# Patient Record
Sex: MALE | Race: WHITE | NOT HISPANIC OR LATINO | URBAN - METROPOLITAN AREA
[De-identification: names, ages, dates, MRNs, and addresses within clinical notes are randomized per-mention and may not be internally consistent; named-entity substitution may affect disease eponyms.]

---

## 2020-12-11 ENCOUNTER — INPATIENT (INPATIENT)
Facility: HOSPITAL | Age: 55
LOS: 2 days | Discharge: HOME | DRG: 247 | End: 2020-12-14
Attending: HOSPITALIST | Admitting: INTERNAL MEDICINE
Payer: COMMERCIAL

## 2020-12-11 VITALS
OXYGEN SATURATION: 93 % | HEART RATE: 92 BPM | RESPIRATION RATE: 19 BRPM | SYSTOLIC BLOOD PRESSURE: 150 MMHG | HEIGHT: 72 IN | DIASTOLIC BLOOD PRESSURE: 84 MMHG | WEIGHT: 253.75 LBS | TEMPERATURE: 98 F

## 2020-12-11 DIAGNOSIS — I21.3 ST ELEVATION (STEMI) MYOCARDIAL INFARCTION OF UNSPECIFIED SITE: ICD-10-CM

## 2020-12-11 LAB
A1C WITH ESTIMATED AVERAGE GLUCOSE RESULT: 7.2 % — HIGH (ref 4–5.6)
ANION GAP SERPL CALC-SCNC: 13 MMOL/L — SIGNIFICANT CHANGE UP (ref 5–17)
ANION GAP SERPL CALC-SCNC: 25 MMOL/L — HIGH (ref 5–17)
APTT BLD: >200 SEC — CRITICAL HIGH (ref 27.5–35.5)
BASOPHILS # BLD AUTO: 0.02 K/UL — SIGNIFICANT CHANGE UP (ref 0–0.2)
BASOPHILS NFR BLD AUTO: 0.2 % — SIGNIFICANT CHANGE UP (ref 0–2)
BLD GP AB SCN SERPL QL: NEGATIVE — SIGNIFICANT CHANGE UP
BUN SERPL-MCNC: 14 MG/DL — SIGNIFICANT CHANGE UP (ref 7–23)
BUN SERPL-MCNC: 18 MG/DL — SIGNIFICANT CHANGE UP (ref 7–23)
CALCIUM SERPL-MCNC: 8.8 MG/DL — SIGNIFICANT CHANGE UP (ref 8.4–10.5)
CALCIUM SERPL-MCNC: 9.2 MG/DL — SIGNIFICANT CHANGE UP (ref 8.4–10.5)
CHLORIDE SERPL-SCNC: 103 MMOL/L — SIGNIFICANT CHANGE UP (ref 96–108)
CHLORIDE SERPL-SCNC: 97 MMOL/L — SIGNIFICANT CHANGE UP (ref 96–108)
CHOLEST SERPL-MCNC: 169 MG/DL — SIGNIFICANT CHANGE UP
CHOLEST SERPL-MCNC: 169 MG/DL — SIGNIFICANT CHANGE UP
CK MB BLD-MCNC: 6.5 % — HIGH (ref 0–3.5)
CK MB BLD-MCNC: 8.6 % — HIGH (ref 0–3.5)
CK MB BLD-MCNC: 8.6 % — HIGH (ref 0–3.5)
CK MB CFR SERPL CALC: 14.1 NG/ML — HIGH (ref 0–6.7)
CK MB CFR SERPL CALC: 30.6 NG/ML — HIGH (ref 0–6.7)
CK MB CFR SERPL CALC: 44.6 NG/ML — HIGH (ref 0–6.7)
CK SERPL-CCNC: 217 U/L — HIGH (ref 30–200)
CK SERPL-CCNC: 354 U/L — HIGH (ref 30–200)
CK SERPL-CCNC: 518 U/L — HIGH (ref 30–200)
CO2 SERPL-SCNC: 16 MMOL/L — LOW (ref 22–31)
CO2 SERPL-SCNC: 23 MMOL/L — SIGNIFICANT CHANGE UP (ref 22–31)
CREAT SERPL-MCNC: 0.67 MG/DL — SIGNIFICANT CHANGE UP (ref 0.5–1.3)
CREAT SERPL-MCNC: 0.71 MG/DL — SIGNIFICANT CHANGE UP (ref 0.5–1.3)
EOSINOPHIL # BLD AUTO: 0.03 K/UL — SIGNIFICANT CHANGE UP (ref 0–0.5)
EOSINOPHIL NFR BLD AUTO: 0.4 % — SIGNIFICANT CHANGE UP (ref 0–6)
ESTIMATED AVERAGE GLUCOSE: 160 MG/DL — HIGH (ref 68–114)
GLUCOSE BLDC GLUCOMTR-MCNC: 112 MG/DL — HIGH (ref 70–99)
GLUCOSE SERPL-MCNC: 141 MG/DL — HIGH (ref 70–99)
GLUCOSE SERPL-MCNC: 225 MG/DL — HIGH (ref 70–99)
HCT VFR BLD CALC: 44.7 % — SIGNIFICANT CHANGE UP (ref 39–50)
HDLC SERPL-MCNC: 28 MG/DL — LOW
HDLC SERPL-MCNC: 30 MG/DL — LOW
HGB BLD-MCNC: 14.2 G/DL — SIGNIFICANT CHANGE UP (ref 13–17)
IMM GRANULOCYTES NFR BLD AUTO: 0.4 % — SIGNIFICANT CHANGE UP (ref 0–1.5)
INR BLD: 1.17 RATIO — HIGH (ref 0.88–1.16)
LIPID PNL WITH DIRECT LDL SERPL: 114 MG/DL — HIGH
LIPID PNL WITH DIRECT LDL SERPL: 121 MG/DL — HIGH
LYMPHOCYTES # BLD AUTO: 1.13 K/UL — SIGNIFICANT CHANGE UP (ref 1–3.3)
LYMPHOCYTES # BLD AUTO: 14 % — SIGNIFICANT CHANGE UP (ref 13–44)
MAGNESIUM SERPL-MCNC: 1.8 MG/DL — SIGNIFICANT CHANGE UP (ref 1.6–2.6)
MAGNESIUM SERPL-MCNC: 2.2 MG/DL — SIGNIFICANT CHANGE UP (ref 1.6–2.6)
MCHC RBC-ENTMCNC: 28 PG — SIGNIFICANT CHANGE UP (ref 27–34)
MCHC RBC-ENTMCNC: 31.8 GM/DL — LOW (ref 32–36)
MCV RBC AUTO: 88 FL — SIGNIFICANT CHANGE UP (ref 80–100)
MONOCYTES # BLD AUTO: 0.41 K/UL — SIGNIFICANT CHANGE UP (ref 0–0.9)
MONOCYTES NFR BLD AUTO: 5.1 % — SIGNIFICANT CHANGE UP (ref 2–14)
NEUTROPHILS # BLD AUTO: 6.48 K/UL — SIGNIFICANT CHANGE UP (ref 1.8–7.4)
NEUTROPHILS NFR BLD AUTO: 79.9 % — HIGH (ref 43–77)
NON HDL CHOLESTEROL: 139 MG/DL — HIGH
NON HDL CHOLESTEROL: 140 MG/DL — HIGH
NRBC # BLD: 0 /100 WBCS — SIGNIFICANT CHANGE UP (ref 0–0)
NT-PROBNP SERPL-SCNC: 53 PG/ML — SIGNIFICANT CHANGE UP (ref 0–300)
PHOSPHATE SERPL-MCNC: 2.9 MG/DL — SIGNIFICANT CHANGE UP (ref 2.5–4.5)
PHOSPHATE SERPL-MCNC: 3 MG/DL — SIGNIFICANT CHANGE UP (ref 2.5–4.5)
PLATELET # BLD AUTO: 154 K/UL — SIGNIFICANT CHANGE UP (ref 150–400)
POTASSIUM SERPL-MCNC: 3.6 MMOL/L — SIGNIFICANT CHANGE UP (ref 3.5–5.3)
POTASSIUM SERPL-MCNC: 4.1 MMOL/L — SIGNIFICANT CHANGE UP (ref 3.5–5.3)
POTASSIUM SERPL-SCNC: 3.6 MMOL/L — SIGNIFICANT CHANGE UP (ref 3.5–5.3)
POTASSIUM SERPL-SCNC: 4.1 MMOL/L — SIGNIFICANT CHANGE UP (ref 3.5–5.3)
PROTHROM AB SERPL-ACNC: 13.9 SEC — HIGH (ref 10.6–13.6)
RBC # BLD: 5.08 M/UL — SIGNIFICANT CHANGE UP (ref 4.2–5.8)
RBC # FLD: 14.6 % — HIGH (ref 10.3–14.5)
RH IG SCN BLD-IMP: POSITIVE — SIGNIFICANT CHANGE UP
SODIUM SERPL-SCNC: 137 MMOL/L — SIGNIFICANT CHANGE UP (ref 135–145)
SODIUM SERPL-SCNC: 139 MMOL/L — SIGNIFICANT CHANGE UP (ref 135–145)
TRIGL SERPL-MCNC: 130 MG/DL — SIGNIFICANT CHANGE UP
TRIGL SERPL-MCNC: 90 MG/DL — SIGNIFICANT CHANGE UP
TROPONIN T, HIGH SENSITIVITY RESULT: 1464 NG/L — HIGH (ref 0–51)
TROPONIN T, HIGH SENSITIVITY RESULT: 156 NG/L — HIGH (ref 0–51)
TROPONIN T, HIGH SENSITIVITY RESULT: 883 NG/L — HIGH (ref 0–51)
TSH SERPL-MCNC: 2.2 UIU/ML — SIGNIFICANT CHANGE UP (ref 0.27–4.2)
WBC # BLD: 8.1 K/UL — SIGNIFICANT CHANGE UP (ref 3.8–10.5)
WBC # FLD AUTO: 8.1 K/UL — SIGNIFICANT CHANGE UP (ref 3.8–10.5)

## 2020-12-11 PROCEDURE — 99223 1ST HOSP IP/OBS HIGH 75: CPT | Mod: 25

## 2020-12-11 PROCEDURE — 99291 CRITICAL CARE FIRST HOUR: CPT | Mod: 25

## 2020-12-11 PROCEDURE — 93010 ELECTROCARDIOGRAM REPORT: CPT | Mod: 59

## 2020-12-11 PROCEDURE — 99291 CRITICAL CARE FIRST HOUR: CPT

## 2020-12-11 PROCEDURE — 92941 PRQ TRLML REVSC TOT OCCL AMI: CPT | Mod: LD

## 2020-12-11 PROCEDURE — 93458 L HRT ARTERY/VENTRICLE ANGIO: CPT | Mod: 26,59

## 2020-12-11 PROCEDURE — 93308 TTE F-UP OR LMTD: CPT | Mod: 26

## 2020-12-11 RX ORDER — DEXTROSE 50 % IN WATER 50 %
15 SYRINGE (ML) INTRAVENOUS ONCE
Refills: 0 | Status: DISCONTINUED | OUTPATIENT
Start: 2020-12-11 | End: 2020-12-14

## 2020-12-11 RX ORDER — INFLUENZA VIRUS VACCINE 15; 15; 15; 15 UG/.5ML; UG/.5ML; UG/.5ML; UG/.5ML
0.5 SUSPENSION INTRAMUSCULAR ONCE
Refills: 0 | Status: DISCONTINUED | OUTPATIENT
Start: 2020-12-11 | End: 2020-12-14

## 2020-12-11 RX ORDER — HEPARIN SODIUM 5000 [USP'U]/ML
5000 INJECTION INTRAVENOUS; SUBCUTANEOUS EVERY 8 HOURS
Refills: 0 | Status: DISCONTINUED | OUTPATIENT
Start: 2020-12-11 | End: 2020-12-14

## 2020-12-11 RX ORDER — ATORVASTATIN CALCIUM 80 MG/1
80 TABLET, FILM COATED ORAL AT BEDTIME
Refills: 0 | Status: DISCONTINUED | OUTPATIENT
Start: 2020-12-11 | End: 2020-12-14

## 2020-12-11 RX ORDER — TICAGRELOR 90 MG/1
90 TABLET ORAL EVERY 12 HOURS
Refills: 0 | Status: DISCONTINUED | OUTPATIENT
Start: 2020-12-11 | End: 2020-12-14

## 2020-12-11 RX ORDER — CHLORHEXIDINE GLUCONATE 213 G/1000ML
1 SOLUTION TOPICAL DAILY
Refills: 0 | Status: DISCONTINUED | OUTPATIENT
Start: 2020-12-11 | End: 2020-12-12

## 2020-12-11 RX ORDER — ASPIRIN/CALCIUM CARB/MAGNESIUM 324 MG
81 TABLET ORAL DAILY
Refills: 0 | Status: DISCONTINUED | OUTPATIENT
Start: 2020-12-11 | End: 2020-12-14

## 2020-12-11 RX ORDER — INSULIN LISPRO 100/ML
VIAL (ML) SUBCUTANEOUS
Refills: 0 | Status: DISCONTINUED | OUTPATIENT
Start: 2020-12-11 | End: 2020-12-11

## 2020-12-11 RX ORDER — SODIUM CHLORIDE 9 MG/ML
1000 INJECTION, SOLUTION INTRAVENOUS
Refills: 0 | Status: DISCONTINUED | OUTPATIENT
Start: 2020-12-11 | End: 2020-12-12

## 2020-12-11 RX ORDER — DEXTROSE 50 % IN WATER 50 %
25 SYRINGE (ML) INTRAVENOUS ONCE
Refills: 0 | Status: DISCONTINUED | OUTPATIENT
Start: 2020-12-11 | End: 2020-12-14

## 2020-12-11 RX ORDER — GLUCAGON INJECTION, SOLUTION 0.5 MG/.1ML
1 INJECTION, SOLUTION SUBCUTANEOUS ONCE
Refills: 0 | Status: DISCONTINUED | OUTPATIENT
Start: 2020-12-11 | End: 2020-12-14

## 2020-12-11 RX ORDER — DEXTROSE 50 % IN WATER 50 %
12.5 SYRINGE (ML) INTRAVENOUS ONCE
Refills: 0 | Status: DISCONTINUED | OUTPATIENT
Start: 2020-12-11 | End: 2020-12-14

## 2020-12-11 RX ORDER — INSULIN LISPRO 100/ML
VIAL (ML) SUBCUTANEOUS
Refills: 0 | Status: DISCONTINUED | OUTPATIENT
Start: 2020-12-11 | End: 2020-12-14

## 2020-12-11 RX ORDER — POTASSIUM CHLORIDE 20 MEQ
40 PACKET (EA) ORAL ONCE
Refills: 0 | Status: COMPLETED | OUTPATIENT
Start: 2020-12-11 | End: 2020-12-11

## 2020-12-11 RX ORDER — INSULIN LISPRO 100/ML
VIAL (ML) SUBCUTANEOUS AT BEDTIME
Refills: 0 | Status: DISCONTINUED | OUTPATIENT
Start: 2020-12-11 | End: 2020-12-14

## 2020-12-11 RX ORDER — ENOXAPARIN SODIUM 100 MG/ML
40 INJECTION SUBCUTANEOUS DAILY
Refills: 0 | Status: DISCONTINUED | OUTPATIENT
Start: 2020-12-11 | End: 2020-12-11

## 2020-12-11 RX ORDER — MAGNESIUM SULFATE 500 MG/ML
1 VIAL (ML) INJECTION ONCE
Refills: 0 | Status: COMPLETED | OUTPATIENT
Start: 2020-12-11 | End: 2020-12-11

## 2020-12-11 RX ORDER — METOPROLOL TARTRATE 50 MG
25 TABLET ORAL
Refills: 0 | Status: DISCONTINUED | OUTPATIENT
Start: 2020-12-11 | End: 2020-12-12

## 2020-12-11 RX ADMIN — Medication 100 GRAM(S): at 08:59

## 2020-12-11 RX ADMIN — ATORVASTATIN CALCIUM 80 MILLIGRAM(S): 80 TABLET, FILM COATED ORAL at 22:10

## 2020-12-11 RX ADMIN — Medication 2: at 12:56

## 2020-12-11 RX ADMIN — Medication 81 MILLIGRAM(S): at 12:55

## 2020-12-11 RX ADMIN — Medication 25 MILLIGRAM(S): at 18:48

## 2020-12-11 RX ADMIN — HEPARIN SODIUM 5000 UNIT(S): 5000 INJECTION INTRAVENOUS; SUBCUTANEOUS at 17:23

## 2020-12-11 RX ADMIN — HEPARIN SODIUM 5000 UNIT(S): 5000 INJECTION INTRAVENOUS; SUBCUTANEOUS at 22:09

## 2020-12-11 RX ADMIN — Medication 2: at 09:00

## 2020-12-11 RX ADMIN — Medication 40 MILLIEQUIVALENT(S): at 08:59

## 2020-12-11 RX ADMIN — TICAGRELOR 90 MILLIGRAM(S): 90 TABLET ORAL at 17:23

## 2020-12-11 NOTE — PROGRESS NOTE ADULT - SUBJECTIVE AND OBJECTIVE BOX
Madisyn Dotson, PGY-2  Internal Medicine   [id number]    PATIENT:  NISHA WISE  [id number]    CHIEF COMPLAINT:  Patient is a 55y old  Male who presents with a chief complaint of     INTERVAL HISTORY/OVERNIGHT EVENTS:    SUBJECTIVE:     MEDICATIONS:  MEDICATIONS  (STANDING):  aspirin enteric coated 81 milliGRAM(s) Oral daily  atorvastatin 80 milliGRAM(s) Oral at bedtime  chlorhexidine 4% Liquid 1 Application(s) Topical daily  influenza   Vaccine 0.5 milliLiter(s) IntraMuscular once  insulin lispro (ADMELOG) corrective regimen sliding scale   SubCutaneous three times a day before meals  insulin lispro (ADMELOG) corrective regimen sliding scale   SubCutaneous at bedtime  ticagrelor 90 milliGRAM(s) Oral every 12 hours    MEDICATIONS  (PRN):      ALLERGIES:  Allergies    Allergy Status Unknown    Intolerances        OBJECTIVE:  ICU Vital Signs Last 24 Hrs  T(C): 36.5 (11 Dec 2020 05:35), Max: 36.5 (11 Dec 2020 05:35)  T(F): 97.7 (11 Dec 2020 05:35), Max: 97.7 (11 Dec 2020 05:35)  HR: 82 (11 Dec 2020 06:30) (82 - 92)  BP: 102/66 (11 Dec 2020 06:30) (102/66 - 150/84)  BP(mean): 78 (11 Dec 2020 06:30) (78 - 111)  ABP: --  ABP(mean): --  RR: 14 (11 Dec 2020 06:30) (14 - 19)  SpO2: 91% (11 Dec 2020 06:30) (91% - 94%)      Adult Advanced Hemodynamics Last 24 Hrs  CVP(mm Hg): --  CVP(cm H2O): --  CO: --  CI: --  PA: --  PA(mean): --  PCWP: --  SVR: --  SVRI: --  PVR: --  PVRI: --  CAPILLARY BLOOD GLUCOSE        CAPILLARY BLOOD GLUCOSE        I&O's Summary    10 Dec 2020 07:01  -  11 Dec 2020 07:00  --------------------------------------------------------  IN: 0 mL / OUT: 450 mL / NET: -450 mL      Daily Height in cm: 182.88 (11 Dec 2020 05:35)    Daily     PHYSICAL EXAMINATION:  General: WN/WD NAD  HEENT: PERRLA, EOMI, moist mucous membranes  Neurology: A&Ox3, nonfocal, MERAZ x 4  Respiratory: CTA B/L, normal respiratory effort, no wheezes, crackles, rales  CV: RRR, S1S2, no murmurs, rubs or gallops  Abdominal: Soft, NT, ND +BS, Last BM  Extremities: No edema, + peripheral pulses  Incisions:   Tubes:    LABS:                          14.2   8.10  )-----------( 154      ( 11 Dec 2020 06:13 )             44.7             PT/INR - ( 11 Dec 2020 06:13 )   PT: 13.9 sec;   INR: 1.17 ratio         PTT - ( 11 Dec 2020 06:13 )  PTT:>200.0 sec            TELEMETRY:     EKG:     IMAGING:       Madisyn Dotson, PGY-2  Internal Medicine   [id number]    PATIENT:  NISHA WISE  [id number]    CHIEF COMPLAINT:  Patient is a 55y old  Male who presents with a chief complaint of chest pain    INTERVAL HISTORY/OVERNIGHT EVENTS: Patient s/p LHC via R radial artery access, patient tolerated the procedure well     SUBJECTIVE: Assessed patient this AM, patient alert, denies chest pain/shortness of breath/abdominal pain.     MEDICATIONS:  MEDICATIONS  (STANDING):  aspirin enteric coated 81 milliGRAM(s) Oral daily  atorvastatin 80 milliGRAM(s) Oral at bedtime  chlorhexidine 4% Liquid 1 Application(s) Topical daily  influenza   Vaccine 0.5 milliLiter(s) IntraMuscular once  insulin lispro (ADMELOG) corrective regimen sliding scale   SubCutaneous three times a day before meals  insulin lispro (ADMELOG) corrective regimen sliding scale   SubCutaneous at bedtime  ticagrelor 90 milliGRAM(s) Oral every 12 hours    MEDICATIONS  (PRN):      ALLERGIES:  Allergies    Allergy Status Unknown    Intolerances        OBJECTIVE:  ICU Vital Signs Last 24 Hrs  T(C): 36.5 (11 Dec 2020 05:35), Max: 36.5 (11 Dec 2020 05:35)  T(F): 97.7 (11 Dec 2020 05:35), Max: 97.7 (11 Dec 2020 05:35)  HR: 82 (11 Dec 2020 06:30) (82 - 92)  BP: 102/66 (11 Dec 2020 06:30) (102/66 - 150/84)  BP(mean): 78 (11 Dec 2020 06:30) (78 - 111)  ABP: --  ABP(mean): --  RR: 14 (11 Dec 2020 06:30) (14 - 19)  SpO2: 91% (11 Dec 2020 06:30) (91% - 94%)      Adult Advanced Hemodynamics Last 24 Hrs  CVP(mm Hg): --  CVP(cm H2O): --  CO: --  CI: --  PA: --  PA(mean): --  PCWP: --  SVR: --  SVRI: --  PVR: --  PVRI: --  CAPILLARY BLOOD GLUCOSE        CAPILLARY BLOOD GLUCOSE        I&O's Summary    10 Dec 2020 07:01  -  11 Dec 2020 07:00  --------------------------------------------------------  IN: 0 mL / OUT: 450 mL / NET: -450 mL      Daily Height in cm: 182.88 (11 Dec 2020 05:35)    Daily     PHYSICAL EXAMINATION:  General: WN/WD NAD  HEENT: PERRLA, EOMI, moist mucous membranes  Neurology: A&Ox3, moves all 4 extremities spontaneously, symmetric facial expressions   Respiratory: normal respiratory effort, + crackles on lower lung fields   CV: RRR, S1S2, no murmurs, rubs or gallops  Abdominal: Soft, NT, ND +BS  Extremities: No LE edema. RUE intact sensation light touch, full range of motion.   Incisions: R radial artery access site clean, dry, intact, no swelling, no hematoma.   Tubes:    LABS:                          14.2   8.10  )-----------( 154      ( 11 Dec 2020 06:13 )             44.7             PT/INR - ( 11 Dec 2020 06:13 )   PT: 13.9 sec;   INR: 1.17 ratio         PTT - ( 11 Dec 2020 06:13 )  PTT:>200.0 sec            TELEMETRY:     EKG:     IMAGING:

## 2020-12-11 NOTE — H&P ADULT - NSHPPHYSICALEXAM_GEN_ALL_CORE
Const: AAOx3, in NAD  Eyes: no conjunctival injection  HENT: NCAT, Neck supple, oral mucosa moist  CV: RRR, +S1, S2  Resp: CTAB, no respiratory distress  GI: Abdomen soft, NTND, no guarding  Extremities: No peripheral edema,  2+ L radial and BL DP pulses, R radial access band in place  Skin: Warm, well perfused, no rash  MSK: No gross deformities appreciated  Neuro: No focal sensory or motor deficits  Psych: Appropriate mood and affect

## 2020-12-11 NOTE — CHART NOTE - NSCHARTNOTEFT_GEN_A_CORE
NISHA WISE  MRN#: [id number]    : Kevin Shabazz MD    INDICATION: STEMI    PROCEDURE:  LIMITED TRANSTHORACIC ECHO    FINDINGS:  Left ventricular size is normal   Left ventricular function is severely reduced, EF is 30-35%  The anterior wall and apex appear severely hypokinetic     Right ventricular size is normal   Right ventricular function is normal    Aortic valve is not thickened or calcified  Aortic valve has no regurgitation    Mitral valve is not thickened or calcified and without annular calcification  Mitral valve has no regurgitation    Tricuspid valve is not thickened or calcified     Left and right atrial sizes appear normal     IVC size is moderately dilated  There is <50% respirophasic change on inspiration    No significant pericardial effusion    INTERPRETATION:  Severely reduced LVEF with wall motion consistent with LAD disease  Normal RV function  CVP appx 12 cm H2O

## 2020-12-11 NOTE — PROGRESS NOTE ADULT - SUBJECTIVE AND OBJECTIVE BOX
NISHA WISE  MRN-[id number]  Patient is a 55y old  Male who presents with a chief complaint of STEMI (11 Dec 2020 07:41)    HPI:  55M PMH DM, AFib s/p ablation (not on AC) presented to [facility] with 10/10 chest pain waking him from sleep at 3am. Found to have SABIHA V2-V6, and transferred to [facility] for cath. S/p PCI DESx2 to 100% pLAD.     Patient states he is currently pain free. (11 Dec 2020 06:16)      Hospital Course:    24 HOUR EVENTS:    REVIEW OF SYSTEMS:   Constitutional: No weakness, fevers, or chills  Eyes/ENT: No visual changes  Respiratory: No cough, wheezing, hemoptysis  Cardiovascular: No chest pain, no palpitations  Gastrointestinal: No abdominal pain. No nausea, vomiting, hematemesis.   Genitourinary: No dysuria  Neurological: No numbness, no weakness  Skin: No itching, rashes    ICU Vital Signs Last 24 Hrs  T(C): 37.6 (11 Dec 2020 19:00), Max: 37.6 (11 Dec 2020 19:00)  T(F): 99.7 (11 Dec 2020 19:00), Max: 99.7 (11 Dec 2020 19:00)  HR: 82 (11 Dec 2020 23:00) (72 - 92)  BP: 133/76 (11 Dec 2020 23:00) (99/63 - 151/84)  BP(mean): 93 (11 Dec 2020 23:00) (74 - 111)  ABP: --  ABP(mean): --  RR: 15 (11 Dec 2020 23:00) (8 - 27)  SpO2: 94% (11 Dec 2020 23:00) (91% - 97%)      CVP(mm Hg): --  CO: --  CI: --  PA: --  PA(mean): --  PA(direct): --  PCWP: --  LA: --  RA: --  SVR: --  SVRI: --  PVR: --  PVRI: --  I&O's Summary    10 Dec 2020 07:01  -  11 Dec 2020 07:00  --------------------------------------------------------  IN: 0 mL / OUT: 1350 mL / NET: -1350 mL    11 Dec 2020 07:01  -  11 Dec 2020 23:45  --------------------------------------------------------  IN: 700 mL / OUT: 1225 mL / NET: -525 mL        CAPILLARY BLOOD GLUCOSE    CAPILLARY BLOOD GLUCOSE      POCT Blood Glucose.: 112 mg/dL (11 Dec 2020 20:57)      PHYSICAL EXAM:   General: No acute distress  Eyes: EOMI, PERRLA, conjunctiva and sclera clear  Chest/Lung: CTAB, no wheezes, rales, or rhonchi  Heart: Regular rate, regular rhythm. Normal S1/S2. No murmurs, rubs, or gallops.  Abdomen: Soft, nontender, nondistended. Normal bowel sounds.  Extremites: 2+ peripheral pulses B/L. No clubbing, cyanosis, or edema.  Neurology: A&O x3, no focal deficits  Skin: No rashes or lesions  ============================I/O===========================   I&O's Detail    10 Dec 2020 07:01  -  11 Dec 2020 07:00  --------------------------------------------------------  IN:  Total IN: 0 mL    OUT:    Voided (mL): 1350 mL  Total OUT: 1350 mL    Total NET: -1350 mL      11 Dec 2020 07:01  -  11 Dec 2020 23:45  --------------------------------------------------------  IN:    IV PiggyBack: 100 mL    Oral Fluid: 600 mL  Total IN: 700 mL    OUT:    Voided (mL): 1225 mL  Total OUT: 1225 mL    Total NET: -525 mL        ============================ LABS =========================                        14.2   8.10  )-----------( 154      ( 11 Dec 2020 06:13 )             44.7     12-11    139  |  103  |  14  ----------------------------<  141<H>  4.1   |  23  |  0.67    Ca    9.2      11 Dec 2020 13:25  Phos  2.9     12-11  Mg     2.2     12-11      Troponin T, High Sensitivity Result: 1464 ng/L (12-11-20 @ 21:07)  Troponin T, High Sensitivity Result: 883 ng/L (12-11-20 @ 13:25)  Troponin T, High Sensitivity Result: 156 ng/L (12-11-20 @ 06:13)    CKMB Units: 44.6 ng/mL (12-11-20 @ 21:07)  CKMB Units: 30.6 ng/mL (12-11-20 @ 13:25)  CKMB Units: 14.1 ng/mL (12-11-20 @ 06:13)    Creatine Kinase, Serum: 518 U/L (12-11-20 @ 21:07)  Creatine Kinase, Serum: 354 U/L (12-11-20 @ 13:25)  Creatine Kinase, Serum: 217 U/L (12-11-20 @ 06:13)    CPK Mass Assay %: 8.6 % (12-11-20 @ 21:07)  CPK Mass Assay %: 8.6 % (12-11-20 @ 13:25)  CPK Mass Assay %: 6.5 % (12-11-20 @ 06:13)          PT/INR - ( 11 Dec 2020 06:13 )   PT: 13.9 sec;   INR: 1.17 ratio         PTT - ( 11 Dec 2020 06:13 )  PTT:>200.0 sec        ======================Micro/Rad/Cardio=================  Telemetry: Reviewed   EKG: Reviewed  CXR: Reviewed  Culture: Reviewed   Echo: Reviewed  Cath: Reviewed  ======================================================  PAST MEDICAL & SURGICAL HISTORY:    ====================ASSESSMENT ==============    Plan:  ====================== NEUROLOGY=====================    ==================== RESPIRATORY======================  Mechanical Ventilation:      ====================CARDIOVASCULAR==================  metoprolol succinate ER 25 milliGRAM(s) Oral two times a day  atorvastatin 80 milliGRAM(s) Oral at bedtime    ===================HEMATOLOGIC/ONC ===================  aspirin enteric coated 81 milliGRAM(s) Oral daily  heparin   Injectable 5000 Unit(s) SubCutaneous every 8 hours  ticagrelor 90 milliGRAM(s) Oral every 12 hours    ===================== RENAL =========================  Continue monitoring urine output    ==================== GASTROINTESTINAL===================  dextrose 5%. 1000 milliLiter(s) (50 mL/Hr) IV Continuous <Continuous>  dextrose 5%. 1000 milliLiter(s) (100 mL/Hr) IV Continuous <Continuous>    =======================    ENDOCRINE  =====================    dextrose 40% Gel 15 Gram(s) Oral once  dextrose 50% Injectable 25 Gram(s) IV Push once  dextrose 50% Injectable 12.5 Gram(s) IV Push once  dextrose 50% Injectable 25 Gram(s) IV Push once  glucagon  Injectable 1 milliGRAM(s) IntraMuscular once  insulin lispro (ADMELOG) corrective regimen sliding scale   SubCutaneous at bedtime  insulin lispro (ADMELOG) corrective regimen sliding scale   SubCutaneous three times a day before meals    ========================INFECTIOUS DISEASE================      Patient requires continuous monitoring with bedside rhythm monitoring, pulse ox monitoring, and intermittent blood gas analysis. Care plan discussed with ICU care team. Patient remained critical and at risk for life threatening decompensation.  Patient seen, examined and plan discussed with CCU team during rounds.     I have personally provided 35 minutes of critical care time excluding time spent on separate procedures.    By signing my name below, I, Cherelle Salgado, attest that this documentation has been prepared under the direction and in the presence of  DURGA Estrada   Electronically signed: Td Chairez, 12-11-20 @ 23:45    I, DURGA Estrada , personally performed the services described in this documentation. all medical record entries made by the susiibbryon were at my direction and in my presence. I have reviewed the chart and agree that the record reflects my personal performance and is accurate and complete  Electronically signed: DURGA Estrada        NISHA WISE  MRN-[id number]  Patient is a 55y old  Male who presents with a chief complaint of STEMI (11 Dec 2020 07:41)    HPI:  55M PMH DM, AFib s/p ablation (not on AC) presented to [facility] with 10/10 chest pain waking him from sleep at 3am. Found to have SABIHA V2-V6, and transferred to [facility] for cath. S/p PCI DESx2 to 100% pLAD.     Patient states he is currently pain free. (11 Dec 2020 06:16)      Hospital Course:  12/11 Admitted to CCU for AWSTEMI s/p MARTHA x2     24 HOUR EVENTS:  Admitted to CCU for AWSTEMI s/p MARTHA x2     REVIEW OF SYSTEMS:   Constitutional: No weakness, fevers, or chills  Eyes/ENT: No visual changes  Respiratory: No cough, wheezing, hemoptysis  Cardiovascular: No chest pain, no palpitations  Gastrointestinal: No abdominal pain. No nausea, vomiting, hematemesis.   Genitourinary: No dysuria  Neurological: No numbness, no weakness  Skin: No itching, rashes    ICU Vital Signs Last 24 Hrs  T(C): 37.6 (11 Dec 2020 19:00), Max: 37.6 (11 Dec 2020 19:00)  T(F): 99.7 (11 Dec 2020 19:00), Max: 99.7 (11 Dec 2020 19:00)  HR: 82 (11 Dec 2020 23:00) (72 - 92)  BP: 133/76 (11 Dec 2020 23:00) (99/63 - 151/84)  BP(mean): 93 (11 Dec 2020 23:00) (74 - 111)  ABP: --  ABP(mean): --  RR: 15 (11 Dec 2020 23:00) (8 - 27)  SpO2: 94% (11 Dec 2020 23:00) (91% - 97%)      I&O's Summary    10 Dec 2020 07:01  -  11 Dec 2020 07:00  --------------------------------------------------------  IN: 0 mL / OUT: 1350 mL / NET: -1350 mL    11 Dec 2020 07:01  -  11 Dec 2020 23:45  --------------------------------------------------------  IN: 700 mL / OUT: 1225 mL / NET: -525 mL        POCT Blood Glucose.: 112 mg/dL (11 Dec 2020 20:57)      PHYSICAL EXAM:   General: No acute distress  Eyes: EOMI, PERRLA, conjunctiva and sclera clear  Chest/Lung: CTAB, no wheezes, rales, or rhonchi  Heart: Regular rate, regular rhythm. Normal S1/S2. No murmurs, rubs, or gallops.  Abdomen: Soft, nontender, nondistended. Normal bowel sounds.  Extremites: 2+ peripheral pulses B/L. No clubbing, cyanosis, or edema.  Neurology: A&O x3, no focal deficits  Skin: No rashes or lesions  ============================I/O===========================   I&O's Detail    10 Dec 2020 07:01  -  11 Dec 2020 07:00  --------------------------------------------------------  IN:  Total IN: 0 mL    OUT:    Voided (mL): 1350 mL  Total OUT: 1350 mL    Total NET: -1350 mL      11 Dec 2020 07:01  -  11 Dec 2020 23:45  --------------------------------------------------------  IN:    IV PiggyBack: 100 mL    Oral Fluid: 600 mL  Total IN: 700 mL    OUT:    Voided (mL): 1225 mL  Total OUT: 1225 mL    Total NET: -525 mL        ============================ LABS =========================                        14.2   8.10  )-----------( 154      ( 11 Dec 2020 06:13 )             44.7     12-11    139  |  103  |  14  ----------------------------<  141<H>  4.1   |  23  |  0.67    Ca    9.2      11 Dec 2020 13:25  Phos  2.9     12-11  Mg     2.2     12-11      Troponin T, High Sensitivity Result: 1464 ng/L (12-11-20 @ 21:07)  Troponin T, High Sensitivity Result: 883 ng/L (12-11-20 @ 13:25)  Troponin T, High Sensitivity Result: 156 ng/L (12-11-20 @ 06:13)    CKMB Units: 44.6 ng/mL (12-11-20 @ 21:07)  CKMB Units: 30.6 ng/mL (12-11-20 @ 13:25)  CKMB Units: 14.1 ng/mL (12-11-20 @ 06:13)    Creatine Kinase, Serum: 518 U/L (12-11-20 @ 21:07)  Creatine Kinase, Serum: 354 U/L (12-11-20 @ 13:25)  Creatine Kinase, Serum: 217 U/L (12-11-20 @ 06:13)    CPK Mass Assay %: 8.6 % (12-11-20 @ 21:07)  CPK Mass Assay %: 8.6 % (12-11-20 @ 13:25)  CPK Mass Assay %: 6.5 % (12-11-20 @ 06:13)          PT/INR - ( 11 Dec 2020 06:13 )   PT: 13.9 sec;   INR: 1.17 ratio         PTT - ( 11 Dec 2020 06:13 )  PTT:>200.0 sec        ======================Micro/Rad/Cardio=================  Telemetry: Reviewed   EKG: Reviewed  Cath: Reviewed  ======================================================  PAST MEDICAL & SURGICAL HISTORY:    ====================ASSESSMENT ==============  Hx of Afib s/p ablation (no AC)  Admitted AWSTEMI s/p MARTHA x2   DM type 2   Severe LV dysfunction & LAD disease      Plan:  ====================== NEUROLOGY=====================  A&Ox3   - Nonfocal, continue to monitor neuro status per ICU protocol.    ==================== RESPIRATORY======================  Comfortable on RA, SpO2 94-96%  - Cont to monitor SpO2 via pulse oximetry.     ====================CARDIOVASCULAR==================  AWSTEMI  -Cath revealing 100% stenosis pLAD, s/p MARTHA x2  -Monitor R radial access site and remove band in AM  - Continue DAPT with ASA and Brilinta plus Lipitor   - Trend cardiac enzymes  - C/w Lopressor ER 25mg BID  - TTE 72 hr pending    Severe LV dysfunction & LAD disease    - LVEF 30-35%, LAD Disease, normal RV on limited TTE 12/11    Hx of AFib s/p ablation   - not on AC   - Monitor tele, in NSR currently    metoprolol succinate ER 25 milliGRAM(s) Oral two times a day  atorvastatin 80 milliGRAM(s) Oral at bedtime  ticagrelor 90 milliGRAM(s) Oral every 12 hours  aspirin enteric coated 81 milliGRAM(s) Oral daily    ===================HEMATOLOGIC/ONC ===================  H/H 14.2 / 44.7   , stable.   - Continue to monitor hemoglobin and hematocrit levels.    VTE prophylaxis   - SQ Heparin 5000U q8h     heparin   Injectable 5000 Unit(s) SubCutaneous every 8 hours      ===================== RENAL =========================  No acute issues  - Continue to monitor I/Os, BUN/Creatinine, and urine output.   - Goal net even fluid balance. Replete lytes PRN. Keep K> 4 and Mg >2.     ==================== GASTROINTESTINAL===================  Tolerating PO DASH/TLC diet.   - No acute issues    dextrose 5%. 1000 milliLiter(s) (50 mL/Hr) IV Continuous <Continuous>  dextrose 5%. 1000 milliLiter(s) (100 mL/Hr) IV Continuous <Continuous>    =======================    ENDOCRINE  =====================  DMT2,   - glycemic control with Admelog sliding scale premeals and qhs and Glucagon PRN.   - Monitor blood glucose levels.     dextrose 40% Gel 15 Gram(s) Oral once  dextrose 50% Injectable 25 Gram(s) IV Push once  dextrose 50% Injectable 12.5 Gram(s) IV Push once  dextrose 50% Injectable 25 Gram(s) IV Push once  glucagon  Injectable 1 milliGRAM(s) IntraMuscular once  insulin lispro (ADMELOG) corrective regimen sliding scale   SubCutaneous at bedtime  insulin lispro (ADMELOG) corrective regimen sliding scale   SubCutaneous three times a day before meals    ========================INFECTIOUS DISEASE================  Temp. 99.7F, WBC within normal limits   - Monitor fever curve and trend WBC    Patient requires continuous monitoring with bedside rhythm monitoring, pulse ox monitoring, and intermittent blood gas analysis. Care plan discussed with ICU care team. Patient remained critical and at risk for life threatening decompensation.  Patient seen, examined and plan discussed with CCU team during rounds.     I have personally provided 35 minutes of critical care time excluding time spent on separate procedures.    By signing my name below, I, Cherelle Salgado, attest that this documentation has been prepared under the direction and in the presence of  DURGA Estrada   Electronically signed: Td Chairez, 12-11-20 @ 23:45    I, DURGA Estrada , personally performed the services described in this documentation. all medical record entries made by the susiibe were at my direction and in my presence. I have reviewed the chart and agree that the record reflects my personal performance and is accurate and complete  Electronically signed: DURGA Estrada        NISHA WISE  MRN-[id number]  Patient is a 55y old  Male who presents with a chief complaint of STEMI (11 Dec 2020 07:41)    HPI:  55M PMH DM, AFib s/p ablation (not on AC) presented to [facility] with 10/10 chest pain waking him from sleep at 3am. Found to have SABIHA V2-V6, and transferred to [facility] for cath. S/p PCI DESx2 to 100% pLAD.     Patient states he is currently pain free. (11 Dec 2020 06:16)      Hospital Course:  12/11 Admitted to CCU for AWSTEMI s/p MARTHA x2     24 HOUR EVENTS:  Admitted to CCU for AWSTEMI s/p MARTHA x2     REVIEW OF SYSTEMS:   Constitutional: No weakness, fevers, or chills  Eyes/ENT: No visual changes  Respiratory: No cough, wheezing, hemoptysis  Cardiovascular: No chest pain, no palpitations  Gastrointestinal: No abdominal pain. No nausea, vomiting, hematemesis.   Genitourinary: No dysuria  Neurological: No numbness, no weakness  Skin: No itching, rashes    ICU Vital Signs Last 24 Hrs  T(C): 37.6 (11 Dec 2020 19:00), Max: 37.6 (11 Dec 2020 19:00)  T(F): 99.7 (11 Dec 2020 19:00), Max: 99.7 (11 Dec 2020 19:00)  HR: 82 (11 Dec 2020 23:00) (72 - 92)  BP: 133/76 (11 Dec 2020 23:00) (99/63 - 151/84)  BP(mean): 93 (11 Dec 2020 23:00) (74 - 111)  RR: 15 (11 Dec 2020 23:00) (8 - 27)  SpO2: 94% (11 Dec 2020 23:00) (91% - 97%)      I&O's Summary    10 Dec 2020 07:01  -  11 Dec 2020 07:00  --------------------------------------------------------  IN: 0 mL / OUT: 1350 mL / NET: -1350 mL    11 Dec 2020 07:01  -  11 Dec 2020 23:45  --------------------------------------------------------  IN: 700 mL / OUT: 1225 mL / NET: -525 mL        POCT Blood Glucose.: 112 mg/dL (11 Dec 2020 20:57)      PHYSICAL EXAM:   General: No acute distress  Eyes: EOMI, PERRLA, conjunctiva and sclera clear  Chest/Lung: CTAB, no wheezes, rales, or rhonchi  Heart: Regular rate, regular rhythm. Normal S1/S2. No murmurs, rubs, or gallops.  Abdomen: Soft, nontender, nondistended. Normal bowel sounds.  Extremites: 2+ peripheral pulses B/L. No clubbing, cyanosis, or edema.  Neurology: A&O x3, no focal deficits  Skin: No rashes or lesions    ============================I/O===========================   I&O's Detail    10 Dec 2020 07:01  -  11 Dec 2020 07:00  --------------------------------------------------------  IN:  Total IN: 0 mL    OUT:    Voided (mL): 1350 mL  Total OUT: 1350 mL    Total NET: -1350 mL      11 Dec 2020 07:01  -  11 Dec 2020 23:45  --------------------------------------------------------  IN:    IV PiggyBack: 100 mL    Oral Fluid: 600 mL  Total IN: 700 mL    OUT:    Voided (mL): 1225 mL  Total OUT: 1225 mL    Total NET: -525 mL        ============================ LABS =========================                        14.2   8.10  )-----------( 154      ( 11 Dec 2020 06:13 )             44.7     12-11    139  |  103  |  14  ----------------------------<  141<H>  4.1   |  23  |  0.67    Ca    9.2      11 Dec 2020 13:25  Phos  2.9     12-11  Mg     2.2     12-11      Troponin T, High Sensitivity Result: 1464 ng/L (12-11-20 @ 21:07)  Troponin T, High Sensitivity Result: 883 ng/L (12-11-20 @ 13:25)  Troponin T, High Sensitivity Result: 156 ng/L (12-11-20 @ 06:13)    CKMB Units: 44.6 ng/mL (12-11-20 @ 21:07)  CKMB Units: 30.6 ng/mL (12-11-20 @ 13:25)  CKMB Units: 14.1 ng/mL (12-11-20 @ 06:13)    Creatine Kinase, Serum: 518 U/L (12-11-20 @ 21:07)  Creatine Kinase, Serum: 354 U/L (12-11-20 @ 13:25)  Creatine Kinase, Serum: 217 U/L (12-11-20 @ 06:13)    CPK Mass Assay %: 8.6 % (12-11-20 @ 21:07)  CPK Mass Assay %: 8.6 % (12-11-20 @ 13:25)  CPK Mass Assay %: 6.5 % (12-11-20 @ 06:13)          PT/INR - ( 11 Dec 2020 06:13 )   PT: 13.9 sec;   INR: 1.17 ratio         PTT - ( 11 Dec 2020 06:13 )  PTT:>200.0 sec        ======================Micro/Rad/Cardio=================  Telemetry: Reviewed   EKG: Reviewed  Cath: Reviewed  ======================================================  PAST MEDICAL & SURGICAL HISTORY:    ====================ASSESSMENT ==============  Hx of Afib s/p ablation (no AC)  Admitted AWSTEMI s/p MARTHA x2   DM type 2   Severe LV dysfunction & LAD disease      Plan:  ====================== NEUROLOGY=====================  A&Ox3   - Nonfocal, continue to monitor neuro status per ICU protocol.    ==================== RESPIRATORY======================  Comfortable on RA, SpO2 94-96%  - Cont to monitor SpO2 via pulse oximetry.     ====================CARDIOVASCULAR==================  AWSTEMI  -Cath revealing 100% stenosis pLAD, s/p MARTHA x2  - Wrist checks per protocol  - Continue DAPT with ASA and Brilinta   - cont statin   - Trend cardiac enzymes  - C/w Toprol ER 25mg BID  - TTE 72 hr pending  - COntinue to trend CE     Hx of AFib s/p ablation   - not on AC   - Monitor tele, in NSR currently    metoprolol succinate ER 25 milliGRAM(s) Oral two times a day  atorvastatin 80 milliGRAM(s) Oral at bedtime  ticagrelor 90 milliGRAM(s) Oral every 12 hours  aspirin enteric coated 81 milliGRAM(s) Oral daily    ===================HEMATOLOGIC/ONC ===================  H/H 14.2 / 44.7   , stable.   - Continue to monitor hemoglobin and hematocrit levels.    VTE prophylaxis   - SQ Heparin 5000U q8h     heparin   Injectable 5000 Unit(s) SubCutaneous every 8 hours      ===================== RENAL =========================  No acute issues  - Continue to monitor I/Os, BUN/Creatinine, and urine output.   - Goal net even fluid balance. Replete lytes PRN. Keep K> 4 and Mg >2.     ==================== GASTROINTESTINAL===================  Tolerating PO DASH/TLC diet.   - No acute issues    dextrose 5%. 1000 milliLiter(s) (50 mL/Hr) IV Continuous <Continuous>  dextrose 5%. 1000 milliLiter(s) (100 mL/Hr) IV Continuous <Continuous>    =======================    ENDOCRINE  =====================  DMT2,   - glycemic control with Admelog sliding scale premeals and qhs and Glucagon PRN.   - Monitor blood glucose levels.     dextrose 40% Gel 15 Gram(s) Oral once  dextrose 50% Injectable 25 Gram(s) IV Push once  dextrose 50% Injectable 12.5 Gram(s) IV Push once  dextrose 50% Injectable 25 Gram(s) IV Push once  glucagon  Injectable 1 milliGRAM(s) IntraMuscular once  insulin lispro (ADMELOG) corrective regimen sliding scale   SubCutaneous at bedtime  insulin lispro (ADMELOG) corrective regimen sliding scale   SubCutaneous three times a day before meals    ========================INFECTIOUS DISEASE================  Temp. 99.7F, WBC within normal limits   - Monitor fever curve and trend WBC    Patient requires continuous monitoring with bedside rhythm monitoring, pulse ox monitoring, and intermittent blood gas analysis. Care plan discussed with ICU care team. Patient remained critical and at risk for life threatening decompensation.  Patient seen, examined and plan discussed with CCU team during rounds.     I have personally provided 35 minutes of critical care time excluding time spent on separate procedures.    By signing my name below, I, Cherelle Salgado, attest that this documentation has been prepared under the direction and in the presence of  DURGA Estrada   Electronically signed: Td Chairez, 12-11-20 @ 23:45    I, DURGA Estrada , personally performed the services described in this documentation. all medical record entries made by the susiibe were at my direction and in my presence. I have reviewed the chart and agree that the record reflects my personal performance and is accurate and complete  Electronically signed: DURGA Estrada

## 2020-12-11 NOTE — CHART NOTE - NSCHARTNOTEFT_GEN_A_CORE
Padua Prediction Score for VTE Risk within 24hours of admission:    Active malignancy:                                                    [  ] YES +3, [ x ] NO   Previous VTE (Excluding Superficial Vein Thrombosis): [  ] YES +3, [ x ] NO  Reduced mobility:                                                     [  ] YES +3, [ x ] NO  Already known thrombophilic condition:                     [  ] YES +3, [ x ] NO  Recent (</=1 month trauma and/or surgery):             [  ] YES +2, [ x ] NO  Elderly age (>/=70):                                                  [  ] YES +1, [ x ] NO  Heart and/or Respiratory Failure:                               [  ] YES +1, [ x ] NO   Acute MI and/or ischemic CVA:                                  [ x ] YES +1, [  ] NO   Acute infection and/or rheumatologic disorder:          [  ] YES +1, [ x ] NO   BMI>/= 30:                                                              [ x ] YES +1, [  ] NO   Ongoing hormonal treatment:                                   [  ] YES +1, [ x ] NO    Total Score: [ 2 ]  points    [ x ] Padua Score <  3: Low Risk of VTE         - Chemical Thromboprophylaxis should be considered on case-by-case basis  [  ] Padua Score >/= 4: High Risk of VTE         - Chemical Thromboprophylaxis is recommended for nonpregnant patients without contraindications (Major bleeding, thrombocytopenia) who are >/=  18 years of age                         VTE Prophylaxis Recommendations:  Mechanical Pneumatic Compression Devices                                [  ]  Yes,  [  ] No, Contraindicated    Chemical VTE Prophylaxis (Heparin/ Lovenox/ Fondaparinux)        [   ] Yes,  [ x ] No              [ ] Contraindicated, because ___Radial band s/p removal this AM @ 845AM and will ambulate this afternoon pending CE peak __              [ ] Already receiving Systemic Anticoagulation

## 2020-12-11 NOTE — H&P ADULT - HISTORY OF PRESENT ILLNESS
55M PMH DM, AFib s/p ablation (not on AC) presented to [facility] with 10/10 chest pain waking him from sleep at 3am. Found to have SABIHA V2-V6, and transferred to [facility] for cath. S/p PCI DESx2 to 100% pLAD.     Patient states he is currently pain free.

## 2020-12-11 NOTE — CHART NOTE - NSCHARTNOTEFT_GEN_A_CORE
Removal of Radial Band    Pulses in the right upper extremity are palpable. The patient was placed in the supine position. The insertion site was identified and the band deflated per protocol. The radial band was removed slowly. Direct pressure was applied for  __0____ minutes/not applicable.      Monitoring of the right wrist and both upper extremities including neuro-vascular checks and vital signs every 15 minutes x 4.    Complications: None/Other    Kedar Chino PA-C CICU

## 2020-12-11 NOTE — H&P ADULT - ASSESSMENT
55M PMH DM, AF s/p ablation admitted for AWSTEMI, s/p PCI to pLAD.    #Neuro  No active issues  -Monitor as per ICU protocol    #CV  AWSTEMI  -Cath revealing 100% stenosis pLAD, s/p MARTHA x2  -Monitor R radial access site, remove band in AM  -C/w ASA, Brilinta, Lipitor  -Introduce GDMT as tolerated  -Trend cardiac enzymes until peak  -72hr TTE    AF s/p ablation  -Not on AC    #Resp  No active issues  -Monitor pulse oximetry    #Heme  No active issues  -Trend CBC    #Renal  -Monitor I/Os, BUN/Cr, and urine output.  -Replete electrolytes as needed to keep K > 4 and Mg > 2.    #GI  DASH/TLC diet    #Endo  DM  -Monitor blood glucose levels  -ISS    #ID  No active issues  -Monitor for fever, leukocytosis

## 2020-12-11 NOTE — PROGRESS NOTE ADULT - ATTENDING COMMENTS
I have personally seen, examined and participated in the care of this patient. I have reviewed all pertinent clinical information, including history, physical exam, plan and the resident's note. I agree with the resident's note with the following additions:    Admitted with anterior wall STEMI s/p PCI  DAPT with ASA, Ticagrelor; Lipitor 80  SBP 90s-100s, chest pain free - defer beta blocker  O2 sats mid 90s on room air  DASH/diabetic diet  Check formal TTE at 72 hours  Normal renal function  H/H acceptable  COVID negative, no antibiotics   Sugars borderline controlled, check Hgb A1c   No central access    The patient required critical care management and I personally provided 35 minutes of non-continuous care to the patient concurrently with the resident/fellow/nurse practitioner, excluding separate procedures and time spent teaching, in addition to discussing the patient and plan at length with the CICU staff and helping coordinate care. I have personally seen, examined and participated in the care of this patient. I have reviewed all pertinent clinical information, including history, physical exam, plan and the resident's note. I agree with the resident's note with the following additions:    Admitted with anterior wall STEMI s/p PCI                  DAPT with ASA, Ticagrelor; Lipitor 80  SBP 90s-100s, chest pain free - defer beta blocker  O2 sats mid 90s on room air  DASH/diabetic diet  Check formal TTE at 72 hours  Normal renal function  H/H acceptable  COVID negative, no antibiotics   Sugars borderline controlled, check Hgb A1c   No central access    The patient required critical care management and I personally provided 35 minutes of non-continuous care to the patient concurrently with the resident/fellow/nurse practitioner, excluding separate procedures and time spent teaching, in addition to discussing the patient and plan at length with the CICU staff and helping coordinate care.

## 2020-12-11 NOTE — PROGRESS NOTE ADULT - ASSESSMENT
55M PMH DM, AF s/p ablation admitted for AWSTEMI, s/p PCI to pLAD.    #Neuro  No active issues  -Monitor as per ICU protocol    #CV  AWSTEMI  -Cath revealing 100% stenosis pLAD, s/p MARTHA x2  -Monitor R radial access site, remove band in AM  -C/w ASA, Brilinta, Lipitor  -Introduce GDMT as tolerated  -Trend cardiac enzymes until peak  -72hr TTE    AF s/p ablation  -Not on AC    #Resp  No active issues  -Monitor pulse oximetry    #Heme  No active issues  -Trend CBC    #Renal  -Monitor I/Os, BUN/Cr, and urine output.  -Replete electrolytes as needed to keep K > 4 and Mg > 2.    #GI  DASH/TLC diet    #Endo  DM  -Monitor blood glucose levels  -ISS    #ID  No active issues  -Monitor for fever, leukocytosis   55M PMH DM, AF s/p ablation admitted for AWSTEMI, s/p cath, 100% stenosis pLAD, s/p MARTHA x2    #Neuro  No active issues  -Monitor as per ICU protocol    #CV  AWSTEMI  -Cath revealing 100% stenosis pLAD, s/p MARTHA x2  -Monitor R radial access site, s/p band removal, site clean/dry/intact, no evidence of hematoma, full range of motion and full sensation of RUE   -C/w ASA, Brilinta, Lipitor  -Introduce GDMT as tolerated  -Trend cardiac enzymes until peak  -72hr TTE    AF s/p ablation  -Not on AC    #Resp  No active issues  -Monitor pulse oximetry    #Heme  No active issues  -Trend CBC    #Renal  -Monitor I/Os, BUN/Cr, and urine output.  -Replete electrolytes as needed to keep K > 4 and Mg > 2.    #GI  DASH/TLC diet    #Endo  DM  -f/u A1c  -ISS    #ID  No active issues  -Monitor for fever, leukocytosis   55M PMH DM, AF s/p ablation admitted for AWSTEMI, s/p cath, 100% stenosis pLAD, s/p MARTHA x2    #Neuro  No active issues  -Monitor as per ICU protocol    #CV  AWSTEMI  -Cath revealing 100% stenosis pLAD, s/p MARTHA x2  -Monitor R radial access site, s/p band removal, site clean/dry/intact, no evidence of hematoma, full range of motion and full sensation of RUE   -C/w ASA, Brilinta, Lipitor  -Introduce GDMT as tolerated-- consider initiate metoprolol 25 BID, current HR 80s-90s  -Trend cardiac enzymes until peak, troponin 156, , CKMB 14.1, CPK 6.5  -72hr TTE    AF s/p ablation  -Not on AC    #Resp  No active issues  -Monitor pulse oximetry    #Heme  No active issues  -Trend CBC    #Renal  - Cr 0.71 WNL  - UOP 1350cc overnight; since 7am, additional 825cc UOP. Euvolemic on exam, did not receive any diuretics since admission   -Monitor I/Os, BUN/Cr, and urine output.  -Replete electrolytes as needed to keep K > 4 and Mg > 2.    #GI  DASH/TLC diet    #Endo  DM  -f/u A1c  -ISS    #ID  No active issues  -Monitor for fever, leukocytosis   55M PMH DM, AF s/p ablation admitted for AWSTEMI, s/p cath, 100% stenosis pLAD, s/p MARTHA x2    #Neuro  No active issues  -Monitor as per ICU protocol    #CV  AWSTEMI  -Cath revealing 100% stenosis pLAD, s/p MARTHA x2  -Monitor R radial access site, s/p band removal, site clean/dry/intact, no evidence of hematoma, full range of motion and full sensation of RUE   -C/w ASA, Brilinta, Lipitor  -Introduce GDMT as tolerated-- hold off metoprolol as currently hypotensive, SBP 90s   -Trend cardiac enzymes until peak, troponin 156, , CKMB 14.1, CPK 6.5  -72hr TTE to evaluate for LV thrombus     AF s/p ablation  -Not on AC    #Resp  No active issues  -Monitor pulse oximetry    #Heme  No active issues  -Trend CBC    #Renal  - Cr 0.71 WNL  - UOP 1350cc overnight; since 7am, additional 825cc UOP. Euvolemic on exam, did not receive any diuretics since admission   -Monitor I/Os, BUN/Cr, and urine output.  -Replete electrolytes as needed to keep K > 4 and Mg > 2.    #GI  DASH/TLC diet    #Endo  DM  -f/u A1c  -ISS    #ID  No active issues  -Monitor for fever, leukocytosis   55M PMH DM, AF s/p ablation admitted for AWSTEMI, s/p cath, 100% stenosis pLAD, s/p MARTHA x2    #Neuro  No active issues  -Monitor as per ICU protocol    #CV  AWSTEMI  -Cath revealing 100% stenosis pLAD, s/p MARTHA x2  -Monitor R radial access site, s/p band removal, site clean/dry/intact, no evidence of hematoma, full range of motion and full sensation of RUE   -C/w ASA, Brilinta, Lipitor  -Introduce GDMT as tolerated-- hold off metoprolol as currently hypotensive, SBP 90s   -Trend cardiac enzymes until peak, troponin 156, , CKMB 14.1, CPK 6.5  -72hr TTE to evaluate for LV thrombus     AF s/p ablation  --Patient was first diagnosed with A fib in 2017, within 1 month of diagnosis patient underwent ablation in [facility]. Per patient, he has been in sinus rhythm ever since the ablation. He was never on anticoagulants or rate control medication  -Patient currently in sinus rhythm, HR 70s-80s    #Resp  No active issues  -Monitor pulse oximetry    #Heme  No active issues  -Trend CBC    #Renal  - Cr 0.71 WNL  - UOP 1350cc overnight; since 7am, additional 825cc UOP. Euvolemic on exam, did not receive any diuretics since admission   -Monitor I/Os, BUN/Cr, and urine output.  -Replete electrolytes as needed to keep K > 4 and Mg > 2.    #GI  DASH/TLC diet    #Endo  DM  -on metformin 500 PO BID at home  -f/u hemoglobin A1c   -c/w sliding scale insulin     #ID  No active issues  -Monitor for fever, leukocytosis   55M PMH DM, AF s/p ablation admitted for AWSTEMI, s/p cath, 100% stenosis pLAD, s/p MARTHA x2    #Neuro  No active issues  -Monitor as per ICU protocol    #CV  AWSTEMI  -Cath revealing 100% stenosis pLAD, s/p MARTHA x2  -Monitor R radial access site, s/p band removal, site clean/dry/intact, no evidence of hematoma, full range of motion and full sensation of RUE   -C/w ASA, Brilinta, Lipitor  -Introduce GDMT as tolerated-- hold off metoprolol as currently hypotensive, SBP 90s   -Trend cardiac enzymes until peak, troponin 156, , CKMB 14.1, CPK 6.5  -72hr TTE to evaluate for LV thrombus     AF s/p ablation  --Patient was first diagnosed with A fib in 2017, within 1 month of diagnosis patient underwent ablation in [facility]. Per patient, he has been in sinus rhythm ever since the ablation. He was never on anticoagulants or rate control medication  -Patient currently in sinus rhythm, HR 70s-80s  -CHADS2-VASc Score 3 for DM, HF, MI. On discharge, may need loop recorder monitoring to evaluate for A fib to decide on anticoagulation     #Resp  No active issues  -Monitor pulse oximetry    #Heme  No active issues  -Trend CBC    #Renal  - Cr 0.71 WNL  - UOP 1350cc overnight; since 7am, additional 825cc UOP. Euvolemic on exam, did not receive any diuretics since admission   -Monitor I/Os, BUN/Cr, and urine output.  -Replete electrolytes as needed to keep K > 4 and Mg > 2.    #GI  DASH/TLC diet    #Endo  DM  -on metformin 500 PO BID at home  -f/u hemoglobin A1c   -c/w sliding scale insulin     #ID  No active issues  -Monitor for fever, leukocytosis

## 2020-12-12 LAB
ANION GAP SERPL CALC-SCNC: 10 MMOL/L — SIGNIFICANT CHANGE UP (ref 5–17)
ANION GAP SERPL CALC-SCNC: 13 MMOL/L — SIGNIFICANT CHANGE UP (ref 5–17)
BASOPHILS # BLD AUTO: 0.02 K/UL — SIGNIFICANT CHANGE UP (ref 0–0.2)
BASOPHILS NFR BLD AUTO: 0.3 % — SIGNIFICANT CHANGE UP (ref 0–2)
BUN SERPL-MCNC: 11 MG/DL — SIGNIFICANT CHANGE UP (ref 7–23)
BUN SERPL-MCNC: 12 MG/DL — SIGNIFICANT CHANGE UP (ref 7–23)
CALCIUM SERPL-MCNC: 8.8 MG/DL — SIGNIFICANT CHANGE UP (ref 8.4–10.5)
CALCIUM SERPL-MCNC: 9.2 MG/DL — SIGNIFICANT CHANGE UP (ref 8.4–10.5)
CHLORIDE SERPL-SCNC: 106 MMOL/L — SIGNIFICANT CHANGE UP (ref 96–108)
CHLORIDE SERPL-SCNC: 106 MMOL/L — SIGNIFICANT CHANGE UP (ref 96–108)
CK MB BLD-MCNC: 7.8 % — HIGH (ref 0–3.5)
CK MB BLD-MCNC: 8.3 % — HIGH (ref 0–3.5)
CK MB CFR SERPL CALC: 36.1 NG/ML — HIGH (ref 0–6.7)
CK MB CFR SERPL CALC: 46.3 NG/ML — HIGH (ref 0–6.7)
CK SERPL-CCNC: 464 U/L — HIGH (ref 30–200)
CK SERPL-CCNC: 558 U/L — HIGH (ref 30–200)
CO2 SERPL-SCNC: 22 MMOL/L — SIGNIFICANT CHANGE UP (ref 22–31)
CO2 SERPL-SCNC: 23 MMOL/L — SIGNIFICANT CHANGE UP (ref 22–31)
CREAT SERPL-MCNC: 0.69 MG/DL — SIGNIFICANT CHANGE UP (ref 0.5–1.3)
CREAT SERPL-MCNC: 0.72 MG/DL — SIGNIFICANT CHANGE UP (ref 0.5–1.3)
EOSINOPHIL # BLD AUTO: 0.09 K/UL — SIGNIFICANT CHANGE UP (ref 0–0.5)
EOSINOPHIL NFR BLD AUTO: 1.3 % — SIGNIFICANT CHANGE UP (ref 0–6)
GLUCOSE BLDC GLUCOMTR-MCNC: 111 MG/DL — HIGH (ref 70–99)
GLUCOSE BLDC GLUCOMTR-MCNC: 126 MG/DL — HIGH (ref 70–99)
GLUCOSE BLDC GLUCOMTR-MCNC: 158 MG/DL — HIGH (ref 70–99)
GLUCOSE SERPL-MCNC: 132 MG/DL — HIGH (ref 70–99)
GLUCOSE SERPL-MCNC: 206 MG/DL — HIGH (ref 70–99)
HCT VFR BLD CALC: 41.6 % — SIGNIFICANT CHANGE UP (ref 39–50)
HGB BLD-MCNC: 13.8 G/DL — SIGNIFICANT CHANGE UP (ref 13–17)
IMM GRANULOCYTES NFR BLD AUTO: 0.3 % — SIGNIFICANT CHANGE UP (ref 0–1.5)
LYMPHOCYTES # BLD AUTO: 1.36 K/UL — SIGNIFICANT CHANGE UP (ref 1–3.3)
LYMPHOCYTES # BLD AUTO: 19.1 % — SIGNIFICANT CHANGE UP (ref 13–44)
MAGNESIUM SERPL-MCNC: 2.2 MG/DL — SIGNIFICANT CHANGE UP (ref 1.6–2.6)
MAGNESIUM SERPL-MCNC: 2.2 MG/DL — SIGNIFICANT CHANGE UP (ref 1.6–2.6)
MCHC RBC-ENTMCNC: 28.3 PG — SIGNIFICANT CHANGE UP (ref 27–34)
MCHC RBC-ENTMCNC: 33.2 GM/DL — SIGNIFICANT CHANGE UP (ref 32–36)
MCV RBC AUTO: 85.2 FL — SIGNIFICANT CHANGE UP (ref 80–100)
MONOCYTES # BLD AUTO: 0.63 K/UL — SIGNIFICANT CHANGE UP (ref 0–0.9)
MONOCYTES NFR BLD AUTO: 8.8 % — SIGNIFICANT CHANGE UP (ref 2–14)
NEUTROPHILS # BLD AUTO: 5 K/UL — SIGNIFICANT CHANGE UP (ref 1.8–7.4)
NEUTROPHILS NFR BLD AUTO: 70.2 % — SIGNIFICANT CHANGE UP (ref 43–77)
NRBC # BLD: 0 /100 WBCS — SIGNIFICANT CHANGE UP (ref 0–0)
PHOSPHATE SERPL-MCNC: 1.9 MG/DL — LOW (ref 2.5–4.5)
PHOSPHATE SERPL-MCNC: 2.5 MG/DL — SIGNIFICANT CHANGE UP (ref 2.5–4.5)
PLATELET # BLD AUTO: 162 K/UL — SIGNIFICANT CHANGE UP (ref 150–400)
POTASSIUM SERPL-MCNC: 3.5 MMOL/L — SIGNIFICANT CHANGE UP (ref 3.5–5.3)
POTASSIUM SERPL-MCNC: 3.9 MMOL/L — SIGNIFICANT CHANGE UP (ref 3.5–5.3)
POTASSIUM SERPL-SCNC: 3.5 MMOL/L — SIGNIFICANT CHANGE UP (ref 3.5–5.3)
POTASSIUM SERPL-SCNC: 3.9 MMOL/L — SIGNIFICANT CHANGE UP (ref 3.5–5.3)
RBC # BLD: 4.88 M/UL — SIGNIFICANT CHANGE UP (ref 4.2–5.8)
RBC # FLD: 14.8 % — HIGH (ref 10.3–14.5)
SODIUM SERPL-SCNC: 139 MMOL/L — SIGNIFICANT CHANGE UP (ref 135–145)
SODIUM SERPL-SCNC: 141 MMOL/L — SIGNIFICANT CHANGE UP (ref 135–145)
TROPONIN T, HIGH SENSITIVITY RESULT: 1778 NG/L — HIGH (ref 0–51)
TROPONIN T, HIGH SENSITIVITY RESULT: 2122 NG/L — HIGH (ref 0–51)
WBC # BLD: 7.12 K/UL — SIGNIFICANT CHANGE UP (ref 3.8–10.5)
WBC # FLD AUTO: 7.12 K/UL — SIGNIFICANT CHANGE UP (ref 3.8–10.5)

## 2020-12-12 PROCEDURE — 99233 SBSQ HOSP IP/OBS HIGH 50: CPT

## 2020-12-12 PROCEDURE — 93010 ELECTROCARDIOGRAM REPORT: CPT

## 2020-12-12 RX ORDER — POTASSIUM CHLORIDE 20 MEQ
40 PACKET (EA) ORAL ONCE
Refills: 0 | Status: COMPLETED | OUTPATIENT
Start: 2020-12-12 | End: 2020-12-12

## 2020-12-12 RX ORDER — LOSARTAN POTASSIUM 100 MG/1
25 TABLET, FILM COATED ORAL DAILY
Refills: 0 | Status: DISCONTINUED | OUTPATIENT
Start: 2020-12-12 | End: 2020-12-14

## 2020-12-12 RX ORDER — METOPROLOL TARTRATE 50 MG
25 TABLET ORAL DAILY
Refills: 0 | Status: DISCONTINUED | OUTPATIENT
Start: 2020-12-12 | End: 2020-12-14

## 2020-12-12 RX ADMIN — HEPARIN SODIUM 5000 UNIT(S): 5000 INJECTION INTRAVENOUS; SUBCUTANEOUS at 05:23

## 2020-12-12 RX ADMIN — ATORVASTATIN CALCIUM 80 MILLIGRAM(S): 80 TABLET, FILM COATED ORAL at 21:56

## 2020-12-12 RX ADMIN — Medication 25 MILLIGRAM(S): at 17:30

## 2020-12-12 RX ADMIN — Medication 2: at 13:56

## 2020-12-12 RX ADMIN — HEPARIN SODIUM 5000 UNIT(S): 5000 INJECTION INTRAVENOUS; SUBCUTANEOUS at 21:56

## 2020-12-12 RX ADMIN — TICAGRELOR 90 MILLIGRAM(S): 90 TABLET ORAL at 05:23

## 2020-12-12 RX ADMIN — HEPARIN SODIUM 5000 UNIT(S): 5000 INJECTION INTRAVENOUS; SUBCUTANEOUS at 15:45

## 2020-12-12 RX ADMIN — Medication 81 MILLIGRAM(S): at 11:41

## 2020-12-12 RX ADMIN — LOSARTAN POTASSIUM 25 MILLIGRAM(S): 100 TABLET, FILM COATED ORAL at 11:41

## 2020-12-12 RX ADMIN — TICAGRELOR 90 MILLIGRAM(S): 90 TABLET ORAL at 17:30

## 2020-12-12 RX ADMIN — Medication 25 MILLIGRAM(S): at 05:23

## 2020-12-12 RX ADMIN — CHLORHEXIDINE GLUCONATE 1 APPLICATION(S): 213 SOLUTION TOPICAL at 11:33

## 2020-12-12 RX ADMIN — Medication 40 MILLIEQUIVALENT(S): at 05:22

## 2020-12-12 NOTE — PROGRESS NOTE ADULT - SUBJECTIVE AND OBJECTIVE BOX
Events:    Review Of Systems:  Constitutional: denies fever, chills, Fatigue   HEENT: denies Blurred vision, Eye Pain, Headache   Respiratory: denies Cough, Wheezing , Shortness of breath  Cardiovascular: denies Chest Pain, Palpitations,  REYES   Gastrointestinal: denies Abdominal Pain, Diarrhea, Constipation   Genitourinary: denies Nocturia, Dysuria, Incontinence  Extremities: denies Swelling, Joint Pain  Neurologic: denies Focal deficit, Paresthesias, Syncope  Lymphatic: denies Swelling, Lymphadenopathy   Skin: denies Rash, Ecchymoses, Wounds   Psychiatry: denies Depression, Suicidal/Homicidal Ideation, anxiety  [X ] 10 point review of systems is otherwise negative except as mentioned above         Medications:  aspirin enteric coated 81 milliGRAM(s) Oral daily  atorvastatin 80 milliGRAM(s) Oral at bedtime  chlorhexidine 4% Liquid 1 Application(s) Topical daily  dextrose 40% Gel 15 Gram(s) Oral once  dextrose 5%. 1000 milliLiter(s) IV Continuous <Continuous>  dextrose 5%. 1000 milliLiter(s) IV Continuous <Continuous>  dextrose 50% Injectable 25 Gram(s) IV Push once  dextrose 50% Injectable 12.5 Gram(s) IV Push once  dextrose 50% Injectable 25 Gram(s) IV Push once  glucagon  Injectable 1 milliGRAM(s) IntraMuscular once  heparin   Injectable 5000 Unit(s) SubCutaneous every 8 hours  influenza   Vaccine 0.5 milliLiter(s) IntraMuscular once  insulin lispro (ADMELOG) corrective regimen sliding scale   SubCutaneous at bedtime  insulin lispro (ADMELOG) corrective regimen sliding scale   SubCutaneous three times a day before meals  losartan 25 milliGRAM(s) Oral daily  metoprolol succinate ER 25 milliGRAM(s) Oral two times a day  ticagrelor 90 milliGRAM(s) Oral every 12 hours    PMH/PSH/FH/SH: [ ] Unchanged  Vitals:  T(C): 36.9 (20 @ 06:00), Max: 37.6 (20 @ 19:00)  HR: 78 (20 @ 06:00) (72 - 90)  BP: 113/72 (20 @ 06:00) (99/63 - 151/84)  BP(mean): 87 (20 @ 06:00) (74 - 105)  RR: 17 (20 @ 06:00) (8 - 27)  SpO2: 95% (20 @ 06:00) (92% - 97%)  Wt(kg): --  Daily     Daily   I&O's Summary    11 Dec 2020 07:01  -  12 Dec 2020 07:00  --------------------------------------------------------  IN: 820 mL / OUT: 1675 mL / NET: -855 mL        Physical Exam:  Appearance: [ ] Normal [ ] NAD  Eyes: [ ] PERRL [ ] EOMI  HENT: [ ] Normal oral muscosa [ ]NC/AT  Cardiovascular: [ ] S1 [ ] S2 [ ] RRR [ ] No m/r/g [ ]No edema [ ] JVP  Procedural Access Site: [ ] No hematoma [ ] Non-tender to palpation [ ] 2+ pulse [ ] No bruit [ ] No Ecchymosis  Respiratory: [ ] Clear to auscultation bilaterally  Gastrointestinal: [ ] Soft [ ] Non-tender [ ] Non-distended [ ] BS+  Musculoskeletal: [ ] No clubbing [ ] No joint deformity   Neurologic: [ ] Non-focal  Lymphatic: [ ] No lymphadenopathy  Psychiatry: [ ] AAOx3 [ ] Mood & affect appropriate  Skin: [ ] No rashes [ ] No ecchymoses [ ] No cyanosis        139  |  106  |  11  ----------------------------<  132<H>  3.5   |  23  |  0.72    Ca    8.8      12 Dec 2020 03:24  Phos  2.5     12-12  Mg     2.2     12-12      PT/INR - ( 11 Dec 2020 06:13 )   PT: 13.9 sec;   INR: 1.17 ratio         PTT - ( 11 Dec 2020 06:13 )  PTT:>200.0 sec  CARDIAC MARKERS ( 12 Dec 2020 03:24 )  x     / x     / 558 U/L / x     / 46.3 ng/mL  CARDIAC MARKERS ( 11 Dec 2020 21:07 )  x     / x     / 518 U/L / x     / 44.6 ng/mL  CARDIAC MARKERS ( 11 Dec 2020 13:25 )  x     / x     / 354 U/L / x     / 30.6 ng/mL  CARDIAC MARKERS ( 11 Dec 2020 06:13 )  x     / x     / 217 U/L / x     / 14.1 ng/mL      Serum Pro-Brain Natriuretic Peptide: 53 pg/mL ( @ 06:13)    Total Cholesterol: 169  LDL: --  HDL: 28  T  Total Cholesterol: 169  LDL: --  HDL: 30  T        ECG:    Echo:    Stress Testing:     Cath:    Imaging:    Interpretation of Telemetry:   HPI:  55M PMH DM, AFib s/p ablation (not on AC) presented to [facility] with 10/10 chest pain waking him from sleep at 3am. Found to have SABIHA V2-V6, and transferred to [facility] for cath. S/p PCI DESx2 to 100% pLAD (11 Dec 2020 06:16)    Events: No acute events CE trending down.     Review Of Systems:  Constitutional: denies fever, chills, Fatigue   HEENT: denies Blurred vision, Eye Pain, Headache   Respiratory: denies Cough, Wheezing , Shortness of breath  Cardiovascular: denies Chest Pain, Palpitations,  REYES   Gastrointestinal: denies Abdominal Pain, Diarrhea, Constipation   Genitourinary: denies Nocturia, Dysuria, Incontinence  Extremities: denies Swelling, Joint Pain  Neurologic: denies Focal deficit, Paresthesias, Syncope  Lymphatic: denies Swelling, Lymphadenopathy   Skin: denies Rash, Ecchymoses, Wounds   Psychiatry: denies Depression, Suicidal/Homicidal Ideation, anxiety  [X ] 10 point review of systems is otherwise negative except as mentioned above         Medications:  aspirin enteric coated 81 milliGRAM(s) Oral daily  atorvastatin 80 milliGRAM(s) Oral at bedtime  chlorhexidine 4% Liquid 1 Application(s) Topical daily  dextrose 40% Gel 15 Gram(s) Oral once  dextrose 5%. 1000 milliLiter(s) IV Continuous <Continuous>  dextrose 5%. 1000 milliLiter(s) IV Continuous <Continuous>  dextrose 50% Injectable 25 Gram(s) IV Push once  dextrose 50% Injectable 12.5 Gram(s) IV Push once  dextrose 50% Injectable 25 Gram(s) IV Push once  glucagon  Injectable 1 milliGRAM(s) IntraMuscular once  heparin   Injectable 5000 Unit(s) SubCutaneous every 8 hours  influenza   Vaccine 0.5 milliLiter(s) IntraMuscular once  insulin lispro (ADMELOG) corrective regimen sliding scale   SubCutaneous at bedtime  insulin lispro (ADMELOG) corrective regimen sliding scale   SubCutaneous three times a day before meals  losartan 25 milliGRAM(s) Oral daily  metoprolol succinate ER 25 milliGRAM(s) Oral two times a day  ticagrelor 90 milliGRAM(s) Oral every 12 hours    Vitals:  T(C): 36.9 (20 @ 06:00), Max: 37.6 (20 @ 19:00)  HR: 78 (20 @ 06:00) (72 - 90)  BP: 113/72 (20 @ 06:00) (99/63 - 151/84)  BP(mean): 87 (20 @ 06:00) (74 - 105)  RR: 17 (20 @ 06:00) (8 - 27)  SpO2: 95% (20 @ 06:00) (92% - 97%)    Daily     Daily   I&O's Summary    11 Dec 2020 07:01  -  12 Dec 2020 07:00  --------------------------------------------------------  IN: 820 mL / OUT: 1675 mL / NET: -855 mL    Physical Exam:  Appearance: [X ] Normal [X ] NAD  Eyes: [X ] PERRL [ X] EOMI  HENT: [ X] Normal oral muscosa [ X]NC/AT  Cardiovascular: [X ] S1 [ X] S2 [X ] RRR .   Procedural Access Site: [ ] No hematoma [ ] Non-tender to palpation [ ] 2+ pulse [ ] No bruit [ ] No Ecchymosis  Respiratory: [ ] Clear to auscultation bilaterally  Gastrointestinal: [ ] Soft [ ] Non-tender [ ] Non-distended [ ] BS+  Musculoskeletal: [ ] No clubbing [ ] No joint deformity   Neurologic: [ ] Non-focal  Lymphatic: [ ] No lymphadenopathy  Psychiatry: [ ] AAOx3 [ ] Mood & affect appropriate  Skin: [ ] No rashes [ ] No ecchymoses [ ] No cyanosis        139  |  106  |  11  ----------------------------<  132<H>  3.5   |  23  |  0.72    Ca    8.8      12 Dec 2020 03:24  Phos  2.5     12-12  Mg     2.2     12-12      PT/INR - ( 11 Dec 2020 06:13 )   PT: 13.9 sec;   INR: 1.17 ratio         PTT - ( 11 Dec 2020 06:13 )  PTT:>200.0 sec  CARDIAC MARKERS ( 12 Dec 2020 03:24 )  x     / x     / 558 U/L / x     / 46.3 ng/mL  CARDIAC MARKERS ( 11 Dec 2020 21:07 )  x     / x     / 518 U/L / x     / 44.6 ng/mL  CARDIAC MARKERS ( 11 Dec 2020 13:25 )  x     / x     / 354 U/L / x     / 30.6 ng/mL  CARDIAC MARKERS ( 11 Dec 2020 06:13 )  x     / x     / 217 U/L / x     / 14.1 ng/mL      Serum Pro-Brain Natriuretic Peptide: 53 pg/mL ( @ 06:13)    Total Cholesterol: 169  LDL: --  HDL: 28  T  Total Cholesterol: 169  LDL: --  HDL: 30  T        ECG:    Echo:    Stress Testing:     Cath:    Imaging:    Interpretation of Telemetry:   HPI:  55M PMH DM, AFib s/p ablation (not on AC) presented to [facility] with 10/10 chest pain waking him from sleep at 3am. Found to have SABIHA V2-V6, and transferred to [facility] for cath. S/p PCI DESx2 to 100% pLAD (11 Dec 2020 06:16)    Events: No acute events CE trending down.     Review Of Systems:  Constitutional: denies fever, chills, Fatigue   HEENT: denies Blurred vision, Eye Pain, Headache   Respiratory: denies Cough, Wheezing , Shortness of breath  Cardiovascular: denies Chest Pain, Palpitations,  REYES   Gastrointestinal: denies Abdominal Pain, Diarrhea, Constipation   Genitourinary: denies Nocturia, Dysuria, Incontinence  Extremities: denies Swelling, Joint Pain  Neurologic: denies Focal deficit, Paresthesias, Syncope  Lymphatic: denies Swelling, Lymphadenopathy   Skin: denies Rash, Ecchymoses, Wounds   Psychiatry: denies Depression, Suicidal/Homicidal Ideation, anxiety  [X ] 10 point review of systems is otherwise negative except as mentioned above         Medications:  aspirin enteric coated 81 milliGRAM(s) Oral daily  atorvastatin 80 milliGRAM(s) Oral at bedtime  chlorhexidine 4% Liquid 1 Application(s) Topical daily  dextrose 40% Gel 15 Gram(s) Oral once  dextrose 5%. 1000 milliLiter(s) IV Continuous <Continuous>  dextrose 5%. 1000 milliLiter(s) IV Continuous <Continuous>  dextrose 50% Injectable 25 Gram(s) IV Push once  dextrose 50% Injectable 12.5 Gram(s) IV Push once  dextrose 50% Injectable 25 Gram(s) IV Push once  glucagon  Injectable 1 milliGRAM(s) IntraMuscular once  heparin   Injectable 5000 Unit(s) SubCutaneous every 8 hours  influenza   Vaccine 0.5 milliLiter(s) IntraMuscular once  insulin lispro (ADMELOG) corrective regimen sliding scale   SubCutaneous at bedtime  insulin lispro (ADMELOG) corrective regimen sliding scale   SubCutaneous three times a day before meals  losartan 25 milliGRAM(s) Oral daily  metoprolol succinate ER 25 milliGRAM(s) Oral two times a day  ticagrelor 90 milliGRAM(s) Oral every 12 hours    Vitals:  T(C): 36.9 (20 @ 06:00), Max: 37.6 (20 @ 19:00)  HR: 78 (20 @ 06:00) (72 - 90)  BP: 113/72 (20 @ 06:00) (99/63 - 151/84)  BP(mean): 87 (20 @ 06:00) (74 - 105)  RR: 17 (20 @ 06:00) (8 - 27)  SpO2: 95% (20 @ 06:00) (92% - 97%)    Daily     Daily   I&O's Summary    11 Dec 2020 07:01  -  12 Dec 2020 07:00  --------------------------------------------------------  IN: 820 mL / OUT: 1675 mL / NET: -855 mL    Physical Exam:  Appearance: [X ] Normal [X ] NAD  Eyes: [X ] PERRL [ X] EOMI  HENT: [ X] Normal oral muscosa [ X]NC/AT  Cardiovascular: [X ] S1 [ X] S2 [X ] RRR .   Respiratory: [X ] Clear to auscultation bilaterally  Gastrointestinal: [X ] Soft [ X] Non-tender [X ] Non-distended   Musculoskeletal: [X ] No clubbing [X ] No joint deformity   Neurologic: [X] Non-focal  Lymphatic: [X ] No lymphadenopathy  Psychiatry: [X ] AAOx3 [X ] Mood & affect appropriate  Skin: [X ] No rashes [X ] No ecchymoses [X ] No cyanosis        139  |  106  |  11  ----------------------------<  132<H>  3.5   |  23  |  0.72    Ca    8.8      12 Dec 2020 03:24  Phos  2.5     12  Mg     2.2     12    PT/INR - ( 11 Dec 2020 06:13 )   PT: 13.9 sec;   INR: 1.17 ratio      PTT - ( 11 Dec 2020 06:13 )  PTT:>200.0 sec  CARDIAC MARKERS ( 12 Dec 2020 03:24 )  x     / x     / 558 U/L / x     / 46.3 ng/mL  CARDIAC MARKERS ( 11 Dec 2020 21:07 )  x     / x     / 518 U/L / x     / 44.6 ng/mL  CARDIAC MARKERS ( 11 Dec 2020 13:25 )  x     / x     / 354 U/L / x     / 30.6 ng/mL  CARDIAC MARKERS ( 11 Dec 2020 06:13 )  x     / x     / 217 U/L / x     / 14.1 ng/mL    Serum Pro-Brain Natriuretic Peptide: 53 pg/mL ( @ 06:13)    Total Cholesterol: 169  HDL: 28  T  Total Cholesterol: 169    HDL: 30    ECG: ec< from: 12 Lead ECG (20 @ 05:59) >  Diagnosis Line NORMAL SINUS RHYTHM  SEPTAL INFARCT , AGE UNDETERMINED  ABNORMAL ECG  NO PREVIOUS ECGS AVAILABLE    Cath: < from: Cardiac Cath Lab - Adult (20 @ 04:20) >  INTERVENTIONAL RECOMMENDATIONS: Successful PCI to the LAD with MARTHA.  DAPT for one year in setting of ACS.  Aggressive medical management and risk factor modification.    Interpretation of Telemetry: SR 81

## 2020-12-12 NOTE — CHART NOTE - NSCHARTNOTEFT_GEN_A_CORE
CCU Transfer Note    Transfer from: CCU    Transfer to: ( X ) Medicine    (  ) Telemetry     (   ) RCU        (    ) Palliative         (   ) Stroke Unit       (  ) MICU   (   ) __________________    Accepting Physician: Dr. Yoko Orellana    Signout given to:     CCU COURSE:  55M PMH DM, AFib s/p ablation (not on AC) presented to [facility] with 10/10 chest pain waking him from sleep at 3am. Found to have SABIHA V2-V6, and transferred to [facility] for cath. S/p PCI DESx2 to 100% pLAD. Lasix 40 IV was given for EDP of 30. Patient is has no complaints at this time.    PAST MEDICAL & SURGICAL HISTORY:      Vital Signs Last 24 Hrs  T(C): 37.4 (12 Dec 2020 19:00), Max: 37.4 (12 Dec 2020 19:00)  T(F): 99.3 (12 Dec 2020 19:00), Max: 99.3 (12 Dec 2020 19:00)  HR: 96 (12 Dec 2020 19:00) (72 - 96)  BP: 93/60 (12 Dec 2020 19:00) (93/60 - 147/81)  BP(mean): 78 (12 Dec 2020 19:00) (72 - 103)  RR: 20 (12 Dec 2020 19:00) (11 - 27)  SpO2: 95% (12 Dec 2020 19:00) (93% - 96%)  I&O's Summary    11 Dec 2020 07:01  -  12 Dec 2020 07:00  --------------------------------------------------------  IN: 820 mL / OUT: 1675 mL / NET: -855 mL    12 Dec 2020 07:01  -  12 Dec 2020 19:30  --------------------------------------------------------  IN: 600 mL / OUT: 700 mL / NET: -100 mL      PHYSICAL EXAM:  Physical Exam:  Appearance: [X ] Normal [X ] NAD  Eyes: [X ] PERRL [ X] EOMI  HENT: [ X] Normal oral muscosa [ X]NC/AT  Cardiovascular: [X ] S1 [ X] S2 [X ] RRR .   Respiratory: [X ] Clear to auscultation bilaterally  Gastrointestinal: [X ] Soft [ X] Non-tender [X ] Non-distended   Musculoskeletal: [X ] No clubbing [X ] No joint deformity   Neurologic: [X] Non-focal  Lymphatic: [X ] No lymphadenopathy  Psychiatry: [X ] AAOx3 [X ] Mood & affect appropriate  Skin: [X ] No rashes [X ] No ecchymoses [X ] No cyanosis    Allergies    Allergy Status Unknown    Intolerances      MEDICATIONS  (STANDING):  aspirin enteric coated 81 milliGRAM(s) Oral daily  atorvastatin 80 milliGRAM(s) Oral at bedtime  chlorhexidine 4% Liquid 1 Application(s) Topical daily  dextrose 40% Gel 15 Gram(s) Oral once  dextrose 5%. 1000 milliLiter(s) (50 mL/Hr) IV Continuous <Continuous>  dextrose 5%. 1000 milliLiter(s) (100 mL/Hr) IV Continuous <Continuous>  dextrose 50% Injectable 25 Gram(s) IV Push once  dextrose 50% Injectable 12.5 Gram(s) IV Push once  dextrose 50% Injectable 25 Gram(s) IV Push once  glucagon  Injectable 1 milliGRAM(s) IntraMuscular once  heparin   Injectable 5000 Unit(s) SubCutaneous every 8 hours  influenza   Vaccine 0.5 milliLiter(s) IntraMuscular once  insulin lispro (ADMELOG) corrective regimen sliding scale   SubCutaneous at bedtime  insulin lispro (ADMELOG) corrective regimen sliding scale   SubCutaneous three times a day before meals  losartan 25 milliGRAM(s) Oral daily  metoprolol succinate ER 25 milliGRAM(s) Oral two times a day  ticagrelor 90 milliGRAM(s) Oral every 12 hours    MEDICATIONS  (PRN):      Adult Advanced Hemodynamics Last 24 Hrs  CVP(mm Hg): --  CVP(cm H2O): --  CO: --  CI: --  PA: --  PA(mean): --  PCWP: --  SVR: --  SVRI: --  PVR: --  PVRI: --    CARDIAC MARKERS ( 12 Dec 2020 11:06 )  x     / x     / 464 U/L / x     / 36.1 ng/mL  CARDIAC MARKERS ( 12 Dec 2020 03:24 )  x     / x     / 558 U/L / x     / 46.3 ng/mL  CARDIAC MARKERS ( 11 Dec 2020 21:07 )  x     / x     / 518 U/L / x     / 44.6 ng/mL  CARDIAC MARKERS ( 11 Dec 2020 13:25 )  x     / x     / 354 U/L / x     / 30.6 ng/mL  CARDIAC MARKERS ( 11 Dec 2020 06:13 )  x     / x     / 217 U/L / x     / 14.1 ng/mL                            13.8   7.12  )-----------( 162      ( 12 Dec 2020 03:24 )             41.6     12-12    141  |  106  |  12  ----------------------------<  206<H>  3.9   |  22  |  0.69    Ca    9.2      12 Dec 2020 11:06  Phos  1.9     12-12  Mg     2.2     12-12      PT/INR - ( 11 Dec 2020 06:13 )   PT: 13.9 sec;   INR: 1.17 ratio         PTT - ( 11 Dec 2020 06:13 )  PTT:>200.0 sec    Lactate:    Ekg:  Telemetry:  Echo:  Cardiac Cath:  Stress Test:  Imaging:    ASSESSMENT & PLAN:   55yoM with PMH DM, AFib s/p ablation (not on AC) presented to [facility] with 10/10 chest pain waking him from sleep at 3am. Found to have SABIHA V2-V6, and transferred to [facility] for cath. S/p PCI DESx2 to 100% pLAD.    Plan:  ====================== NEUROLOGY=====================  A&Ox3   - Nonfocal, continue to monitor neuro status per ICU protocol.    ==================== RESPIRATORY======================  Comfortable on RA, SpO2>93%  - Cont to monitor SpO2 via pulse oximetry.     ====================CARDIOVASCULAR==================  AWSTEMI  -Cath revealing 100% stenosis pLAD, s/p MARTHA x2  - Wrist checks per protocol  - Continue DAPT with ASA and Brilinta   - cont statin   - Trend cardiac enzymes  - C/w Toprol ER 25mg BID  - TTE 72 hr pending  - Continue to trend Cardiac Enzymes until peak  - Diuresed well, net negative 900cc, and appears euvolemic. No further lasix at this time     Hx of AFib s/p ablation   - not on AC   - Monitor tele, in NSR currently    metoprolol succinate ER 25 milliGRAM(s) Oral two times a day  atorvastatin 80 milliGRAM(s) Oral at bedtime  ticagrelor 90 milliGRAM(s) Oral every 12 hours  aspirin enteric coated 81 milliGRAM(s) Oral daily    ===================HEMATOLOGIC/ONC ===================  H/H 13.8 / 41.6, stable.   - Continue to monitor hemoglobin and hematocrit levels.    VTE prophylaxis   - SQ Heparin 5000U q8h     heparin   Injectable 5000 Unit(s) SubCutaneous every 8 hours      ===================== RENAL =========================  No acute issues  - Continue to monitor I/Os, BUN/Creatinine, and urine output.   - Goal net even fluid balance. Replete lytes PRN. Keep K> 4 and Mg >2.     ==================== GASTROINTESTINAL===================  Tolerating PO DASH/TLC diet.   - No acute issues    dextrose 5%. 1000 milliLiter(s) (50 mL/Hr) IV Continuous <Continuous>  dextrose 5%. 1000 milliLiter(s) (100 mL/Hr) IV Continuous <Continuous>    =======================    ENDOCRINE  =====================  DMT2,   - glycemic control with Admelog sliding scale premeals and qhs and Glucagon PRN.   - Monitor blood glucose levels.     dextrose 40% Gel 15 Gram(s) Oral once  dextrose 50% Injectable 25 Gram(s) IV Push once  dextrose 50% Injectable 12.5 Gram(s) IV Push once  dextrose 50% Injectable 25 Gram(s) IV Push once  glucagon  Injectable 1 milliGRAM(s) IntraMuscular once  insulin lispro (ADMELOG) corrective regimen sliding scale   SubCutaneous at bedtime  insulin lispro (ADMELOG) corrective regimen sliding scale   SubCutaneous three times a day before meals    ========================INFECTIOUS DISEASE================  Afebrile, WBC within normal limits   - Monitor fever curve and trend WBC      FOR FOLLOW UP:  [ ] 72hr TTE  [ ] Trend cardiac enzymes until peak    CCU [id number] CCU Transfer Note    Transfer from: CCU    Transfer to: ( X ) Medicine    (  ) Telemetry     (   ) RCU        (    ) Palliative         (   ) Stroke Unit       (  ) MICU   (   ) __________________    Accepting Physician: Dr. Yoko Orellana    Signout given to: DURGA Reynoso    CCU COURSE:  55M PMH DM, AFib s/p ablation (not on AC) presented to [facility] with 10/10 chest pain waking him from sleep at 3am. Found to have SABIHA V2-V6, and transferred to [facility] for cath. S/p PCI DESx2 to 100% pLAD. Lasix 40 IV was given for EDP of 30. Patient is has no complaints at this time.    PAST MEDICAL & SURGICAL HISTORY:      Vital Signs Last 24 Hrs  T(C): 37.4 (12 Dec 2020 19:00), Max: 37.4 (12 Dec 2020 19:00)  T(F): 99.3 (12 Dec 2020 19:00), Max: 99.3 (12 Dec 2020 19:00)  HR: 96 (12 Dec 2020 19:00) (72 - 96)  BP: 93/60 (12 Dec 2020 19:00) (93/60 - 147/81)  BP(mean): 78 (12 Dec 2020 19:00) (72 - 103)  RR: 20 (12 Dec 2020 19:00) (11 - 27)  SpO2: 95% (12 Dec 2020 19:00) (93% - 96%)  I&O's Summary    11 Dec 2020 07:01  -  12 Dec 2020 07:00  --------------------------------------------------------  IN: 820 mL / OUT: 1675 mL / NET: -855 mL    12 Dec 2020 07:01  -  12 Dec 2020 19:30  --------------------------------------------------------  IN: 600 mL / OUT: 700 mL / NET: -100 mL      PHYSICAL EXAM:  Physical Exam:  Appearance: [X ] Normal [X ] NAD  Eyes: [X ] PERRL [ X] EOMI  HENT: [ X] Normal oral muscosa [ X]NC/AT  Cardiovascular: [X ] S1 [ X] S2 [X ] RRR .   Respiratory: [X ] Clear to auscultation bilaterally  Gastrointestinal: [X ] Soft [ X] Non-tender [X ] Non-distended   Musculoskeletal: [X ] No clubbing [X ] No joint deformity   Neurologic: [X] Non-focal  Lymphatic: [X ] No lymphadenopathy  Psychiatry: [X ] AAOx3 [X ] Mood & affect appropriate  Skin: [X ] No rashes [X ] No ecchymoses [X ] No cyanosis    Allergies    Allergy Status Unknown    Intolerances      MEDICATIONS  (STANDING):  aspirin enteric coated 81 milliGRAM(s) Oral daily  atorvastatin 80 milliGRAM(s) Oral at bedtime  chlorhexidine 4% Liquid 1 Application(s) Topical daily  dextrose 40% Gel 15 Gram(s) Oral once  dextrose 5%. 1000 milliLiter(s) (50 mL/Hr) IV Continuous <Continuous>  dextrose 5%. 1000 milliLiter(s) (100 mL/Hr) IV Continuous <Continuous>  dextrose 50% Injectable 25 Gram(s) IV Push once  dextrose 50% Injectable 12.5 Gram(s) IV Push once  dextrose 50% Injectable 25 Gram(s) IV Push once  glucagon  Injectable 1 milliGRAM(s) IntraMuscular once  heparin   Injectable 5000 Unit(s) SubCutaneous every 8 hours  influenza   Vaccine 0.5 milliLiter(s) IntraMuscular once  insulin lispro (ADMELOG) corrective regimen sliding scale   SubCutaneous at bedtime  insulin lispro (ADMELOG) corrective regimen sliding scale   SubCutaneous three times a day before meals  losartan 25 milliGRAM(s) Oral daily  metoprolol succinate ER 25 milliGRAM(s) Oral two times a day  ticagrelor 90 milliGRAM(s) Oral every 12 hours    MEDICATIONS  (PRN):      Adult Advanced Hemodynamics Last 24 Hrs  CVP(mm Hg): --  CVP(cm H2O): --  CO: --  CI: --  PA: --  PA(mean): --  PCWP: --  SVR: --  SVRI: --  PVR: --  PVRI: --    CARDIAC MARKERS ( 12 Dec 2020 11:06 )  x     / x     / 464 U/L / x     / 36.1 ng/mL  CARDIAC MARKERS ( 12 Dec 2020 03:24 )  x     / x     / 558 U/L / x     / 46.3 ng/mL  CARDIAC MARKERS ( 11 Dec 2020 21:07 )  x     / x     / 518 U/L / x     / 44.6 ng/mL  CARDIAC MARKERS ( 11 Dec 2020 13:25 )  x     / x     / 354 U/L / x     / 30.6 ng/mL  CARDIAC MARKERS ( 11 Dec 2020 06:13 )  x     / x     / 217 U/L / x     / 14.1 ng/mL                            13.8   7.12  )-----------( 162      ( 12 Dec 2020 03:24 )             41.6     12-12    141  |  106  |  12  ----------------------------<  206<H>  3.9   |  22  |  0.69    Ca    9.2      12 Dec 2020 11:06  Phos  1.9     12-12  Mg     2.2     12-12      PT/INR - ( 11 Dec 2020 06:13 )   PT: 13.9 sec;   INR: 1.17 ratio         PTT - ( 11 Dec 2020 06:13 )  PTT:>200.0 sec    Lactate:    Ekg:  Telemetry:  Echo:  Cardiac Cath:  Stress Test:  Imaging:    ASSESSMENT & PLAN:   55yoM with PMH DM, AFib s/p ablation (not on AC) presented to [facility] with 10/10 chest pain waking him from sleep at 3am. Found to have SABIHA V2-V6, and transferred to [facility] for cath. S/p PCI DESx2 to 100% pLAD.    Plan:  ====================== NEUROLOGY=====================  A&Ox3   - Nonfocal, continue to monitor neuro status per ICU protocol.    ==================== RESPIRATORY======================  Comfortable on RA, SpO2>93%  - Cont to monitor SpO2 via pulse oximetry.     ====================CARDIOVASCULAR==================  AWSTEMI  -Cath revealing 100% stenosis pLAD, s/p MARTHA x2  - Wrist checks per protocol  - Continue DAPT with ASA and Brilinta   - cont statin   - Trend cardiac enzymes  - C/w Toprol ER 25mg BID  - TTE 72 hr pending  - Continue to trend Cardiac Enzymes until peak  - Diuresed well, net negative 900cc, and appears euvolemic. No further lasix at this time     Hx of AFib s/p ablation   - not on AC   - Monitor tele, in NSR currently    metoprolol succinate ER 25 milliGRAM(s) Oral two times a day  atorvastatin 80 milliGRAM(s) Oral at bedtime  ticagrelor 90 milliGRAM(s) Oral every 12 hours  aspirin enteric coated 81 milliGRAM(s) Oral daily    ===================HEMATOLOGIC/ONC ===================  H/H 13.8 / 41.6, stable.   - Continue to monitor hemoglobin and hematocrit levels.    VTE prophylaxis   - SQ Heparin 5000U q8h     heparin   Injectable 5000 Unit(s) SubCutaneous every 8 hours      ===================== RENAL =========================  No acute issues  - Continue to monitor I/Os, BUN/Creatinine, and urine output.   - Goal net even fluid balance. Replete lytes PRN. Keep K> 4 and Mg >2.     ==================== GASTROINTESTINAL===================  Tolerating PO DASH/TLC diet.   - No acute issues    dextrose 5%. 1000 milliLiter(s) (50 mL/Hr) IV Continuous <Continuous>  dextrose 5%. 1000 milliLiter(s) (100 mL/Hr) IV Continuous <Continuous>    =======================    ENDOCRINE  =====================  DMT2,   - glycemic control with Admelog sliding scale premeals and qhs and Glucagon PRN.   - Monitor blood glucose levels.     dextrose 40% Gel 15 Gram(s) Oral once  dextrose 50% Injectable 25 Gram(s) IV Push once  dextrose 50% Injectable 12.5 Gram(s) IV Push once  dextrose 50% Injectable 25 Gram(s) IV Push once  glucagon  Injectable 1 milliGRAM(s) IntraMuscular once  insulin lispro (ADMELOG) corrective regimen sliding scale   SubCutaneous at bedtime  insulin lispro (ADMELOG) corrective regimen sliding scale   SubCutaneous three times a day before meals    ========================INFECTIOUS DISEASE================  Afebrile, WBC within normal limits   - Monitor fever curve and trend WBC      FOR FOLLOW UP:  [ ] 72hr TTE  [ ] Trend cardiac enzymes until peak    CCU [id number]

## 2020-12-12 NOTE — PROGRESS NOTE ADULT - ATTENDING COMMENTS
The patient was seen and examined with team. Patient without complaints. Responded to lasix with neg  900. Hold on further diuretic for now.

## 2020-12-12 NOTE — CHART NOTE - NSCHARTNOTEFT_GEN_A_CORE
MICU Transfer Note    Transfer from: MICU  Transfer to:  (  ) Medicine    (  ) Telemetry    (  ) RCU    (  ) Palliative    (  ) Stroke Unit    (  ) _______________  Accepting physican:    *THIS NOTE IS INCOMPLETE**    MICU COURSE:  Patient is a 55 y.o M w/ [facility]         ASSESSMENT & PLAN:         For Follow-Up:          Vital Signs Last 24 Hrs  T(C): 37.2 (12 Dec 2020 22:35), Max: 37.4 (12 Dec 2020 19:00)  T(F): 98.9 (12 Dec 2020 22:35), Max: 99.3 (12 Dec 2020 19:00)  HR: 82 (12 Dec 2020 22:35) (72 - 96)  BP: 121/79 (12 Dec 2020 22:35) (93/60 - 139/73)  BP(mean): 71 (12 Dec 2020 21:00) (71 - 97)  RR: 20 (12 Dec 2020 22:35) (11 - 27)  SpO2: 95% (12 Dec 2020 22:35) (93% - 96%)  I&O's Summary    11 Dec 2020 07:01  -  12 Dec 2020 07:00  --------------------------------------------------------  IN: 820 mL / OUT: 1675 mL / NET: -855 mL    12 Dec 2020 07:01  -  12 Dec 2020 22:57  --------------------------------------------------------  IN: 600 mL / OUT: 1000 mL / NET: -400 mL          MEDICATIONS  (STANDING):  aspirin enteric coated 81 milliGRAM(s) Oral daily  atorvastatin 80 milliGRAM(s) Oral at bedtime  dextrose 40% Gel 15 Gram(s) Oral once  dextrose 50% Injectable 25 Gram(s) IV Push once  dextrose 50% Injectable 12.5 Gram(s) IV Push once  dextrose 50% Injectable 25 Gram(s) IV Push once  glucagon  Injectable 1 milliGRAM(s) IntraMuscular once  heparin   Injectable 5000 Unit(s) SubCutaneous every 8 hours  influenza   Vaccine 0.5 milliLiter(s) IntraMuscular once  insulin lispro (ADMELOG) corrective regimen sliding scale   SubCutaneous at bedtime  insulin lispro (ADMELOG) corrective regimen sliding scale   SubCutaneous three times a day before meals  losartan 25 milliGRAM(s) Oral daily  metoprolol succinate ER 25 milliGRAM(s) Oral two times a day  ticagrelor 90 milliGRAM(s) Oral every 12 hours    MEDICATIONS  (PRN):        LABS                                            13.8                  Neurophils% (auto):   70.2   (12-12 @ 03:24):    7.12 )-----------(162          Lymphocytes% (auto):  19.1                                          41.6                   Eosinphils% (auto):   1.3      Manual%: Neutrophils x    ; Lymphocytes x    ; Eosinophils x    ; Bands%: x    ; Blasts x                                    141    |  106    |  12                  Calcium: 9.2   / iCa: x      (12-12 @ 11:06)    ----------------------------<  206       Magnesium: 2.2                              3.9     |  22     |  0.69             Phosphorous: 1.9 MICU Transfer Note    Transfer from: MICU  Transfer to:  (  ) Medicine    (  ) Telemetry    (  ) RCU    (  ) Palliative    (  ) Stroke Unit    (  ) _______________  Accepting physican:    *THIS NOTE IS INCOMPLETE**    MICU COURSE:  Patient is a 55 y.o M w/ PMH non-insulin dependent T2DM and a. fib s/p ablation (not on AC) who presented w/ CP, found to have AWSTEMI (SABIHA V2-V6) s/p LHC w/ PCI MARTHA x2 to 100% pLAD. CKMB peaked at 46.3. Currently on ASA, brilinta, lipitor, toprol xl 25 BID, and losartan. Noted to be fluid overloaded in the setting of EDP of 30, s/p lasix 40 IVP x1, now euvolemic.       ASSESSMENT & PLAN:   Patient is a 55 y.o M w/ PMH non-insulin dependent T2DM and a. fib s/p ablation (not on AC) who presented w/ CP, found to have AWSTEMI (SABIHA V2-V6) s/p LHC w/ PCI MARTHA x2 to 100% pLAD.     #AWSTEMI  -s/p LHC s/p MARTHA x2 to pLAD  -c/w ASA/brilinta  -c/w lipitor  -c/w toprol xl 25 daily  -c/w losartan   -f/u 72 hour TTE on 12/14    #A. fib s/p ablation  -CHadsvasc 3, but currently not on AC  -currently in NSR    #T2DM  -home medication includes metformin 500 BID  -A1c 7.2%  -c/w lss    For Follow-Up:    [ ] 72hr TTE on 12/14  [ ] CKMB peaked at 46.3, f/u trop in AM       Vital Signs Last 24 Hrs  T(C): 37.2 (12 Dec 2020 22:35), Max: 37.4 (12 Dec 2020 19:00)  T(F): 98.9 (12 Dec 2020 22:35), Max: 99.3 (12 Dec 2020 19:00)  HR: 82 (12 Dec 2020 22:35) (72 - 96)  BP: 121/79 (12 Dec 2020 22:35) (93/60 - 139/73)  BP(mean): 71 (12 Dec 2020 21:00) (71 - 97)  RR: 20 (12 Dec 2020 22:35) (11 - 27)  SpO2: 95% (12 Dec 2020 22:35) (93% - 96%)  I&O's Summary    11 Dec 2020 07:01  -  12 Dec 2020 07:00  --------------------------------------------------------  IN: 820 mL / OUT: 1675 mL / NET: -855 mL    12 Dec 2020 07:01  -  12 Dec 2020 22:57  --------------------------------------------------------  IN: 600 mL / OUT: 1000 mL / NET: -400 mL          MEDICATIONS  (STANDING):  aspirin enteric coated 81 milliGRAM(s) Oral daily  atorvastatin 80 milliGRAM(s) Oral at bedtime  dextrose 40% Gel 15 Gram(s) Oral once  dextrose 50% Injectable 25 Gram(s) IV Push once  dextrose 50% Injectable 12.5 Gram(s) IV Push once  dextrose 50% Injectable 25 Gram(s) IV Push once  glucagon  Injectable 1 milliGRAM(s) IntraMuscular once  heparin   Injectable 5000 Unit(s) SubCutaneous every 8 hours  influenza   Vaccine 0.5 milliLiter(s) IntraMuscular once  insulin lispro (ADMELOG) corrective regimen sliding scale   SubCutaneous at bedtime  insulin lispro (ADMELOG) corrective regimen sliding scale   SubCutaneous three times a day before meals  losartan 25 milliGRAM(s) Oral daily  metoprolol succinate ER 25 milliGRAM(s) Oral two times a day  ticagrelor 90 milliGRAM(s) Oral every 12 hours    MEDICATIONS  (PRN):        LABS                                            13.8                  Neurophils% (auto):   70.2   (12-12 @ 03:24):    7.12 )-----------(162          Lymphocytes% (auto):  19.1                                          41.6                   Eosinphils% (auto):   1.3      Manual%: Neutrophils x    ; Lymphocytes x    ; Eosinophils x    ; Bands%: x    ; Blasts x                                    141    |  106    |  12                  Calcium: 9.2   / iCa: x      (12-12 @ 11:06)    ----------------------------<  206       Magnesium: 2.2                              3.9     |  22     |  0.69             Phosphorous: 1.9 MICU Transfer Note    Transfer from: MICU  Transfer to:  (  ) Medicine    (  ) Telemetry    (  ) RCU    (  ) Palliative    (  ) Stroke Unit    (  ) _______________  Accepting physican:    MICU COURSE:  Patient is a 55 y.o M w/ PMH non-insulin dependent T2DM and a. fib s/p ablation (not on AC) who presented w/ CP, found to have AWSTEMI (SABIHA V2-V6) s/p LHC w/ PCI MARTHA x2 to 100% pLAD. CKMB peaked at 46.3. Currently on ASA, brilinta, lipitor, toprol xl 25 BID, and losartan. Noted to be fluid overloaded in the setting of EDP of 30, s/p lasix 40 IVP x1, now euvolemic.       ASSESSMENT & PLAN:   Patient is a 55 y.o M w/ PMH non-insulin dependent T2DM and a. fib s/p ablation (not on AC) who presented w/ CP, found to have AWSTEMI (SABIHA V2-V6) s/p LHC w/ PCI MARTHA x2 to 100% pLAD.     #AWSTEMI  -s/p LHC s/p MARTHA x2 to pLAD  -c/w ASA/brilinta  -c/w lipitor  -c/w toprol xl 25 daily  -c/w losartan   -f/u 72 hour TTE on 12/14    #A. fib s/p ablation  -CHadsvasc 3, but currently not on AC  -currently in NSR    #T2DM  -home medication includes metformin 500 BID  -A1c 7.2%  -c/w lss    For Follow-Up:    [ ] 72hr TTE on 12/14  [ ] CKMB peaked at 46.3, f/u trop in AM       Vital Signs Last 24 Hrs  T(C): 37.2 (12 Dec 2020 22:35), Max: 37.4 (12 Dec 2020 19:00)  T(F): 98.9 (12 Dec 2020 22:35), Max: 99.3 (12 Dec 2020 19:00)  HR: 82 (12 Dec 2020 22:35) (72 - 96)  BP: 121/79 (12 Dec 2020 22:35) (93/60 - 139/73)  BP(mean): 71 (12 Dec 2020 21:00) (71 - 97)  RR: 20 (12 Dec 2020 22:35) (11 - 27)  SpO2: 95% (12 Dec 2020 22:35) (93% - 96%)  I&O's Summary    11 Dec 2020 07:01  -  12 Dec 2020 07:00  --------------------------------------------------------  IN: 820 mL / OUT: 1675 mL / NET: -855 mL    12 Dec 2020 07:01  -  12 Dec 2020 22:57  --------------------------------------------------------  IN: 600 mL / OUT: 1000 mL / NET: -400 mL          MEDICATIONS  (STANDING):  aspirin enteric coated 81 milliGRAM(s) Oral daily  atorvastatin 80 milliGRAM(s) Oral at bedtime  dextrose 40% Gel 15 Gram(s) Oral once  dextrose 50% Injectable 25 Gram(s) IV Push once  dextrose 50% Injectable 12.5 Gram(s) IV Push once  dextrose 50% Injectable 25 Gram(s) IV Push once  glucagon  Injectable 1 milliGRAM(s) IntraMuscular once  heparin   Injectable 5000 Unit(s) SubCutaneous every 8 hours  influenza   Vaccine 0.5 milliLiter(s) IntraMuscular once  insulin lispro (ADMELOG) corrective regimen sliding scale   SubCutaneous at bedtime  insulin lispro (ADMELOG) corrective regimen sliding scale   SubCutaneous three times a day before meals  losartan 25 milliGRAM(s) Oral daily  metoprolol succinate ER 25 milliGRAM(s) Oral two times a day  ticagrelor 90 milliGRAM(s) Oral every 12 hours    MEDICATIONS  (PRN):        LABS                                            13.8                  Neurophils% (auto):   70.2   (12-12 @ 03:24):    7.12 )-----------(162          Lymphocytes% (auto):  19.1                                          41.6                   Eosinphils% (auto):   1.3      Manual%: Neutrophils x    ; Lymphocytes x    ; Eosinophils x    ; Bands%: x    ; Blasts x                                    141    |  106    |  12                  Calcium: 9.2   / iCa: x      (12-12 @ 11:06)    ----------------------------<  206       Magnesium: 2.2                              3.9     |  22     |  0.69             Phosphorous: 1.9 MICU Transfer Note    Transfer from: MICU  Transfer to:  (  ) Medicine    (  ) Telemetry    (  ) RCU    (  ) Palliative    (  ) Stroke Unit    (  ) _______________  Accepting physican:    MICU COURSE:  Patient is a 55 y.o M w/ PMH non-insulin dependent T2DM and a. fib s/p ablation (not on AC) who presented w/ CP, found to have AWSTEMI (SABIHA V2-V6) s/p LHC w/ PCI MARTHA x2 to 100% pLAD. CKMB peaked at 46.3. Currently on ASA, brilinta, lipitor, toprol xl 25 daily, and losartan. Noted to be fluid overloaded in the setting of EDP of 30, s/p lasix 40 IVP x1, now euvolemic.       ASSESSMENT & PLAN:   Patient is a 55 y.o M w/ PMH non-insulin dependent T2DM and a. fib s/p ablation (not on AC) who presented w/ CP, found to have AWSTEMI (SABIHA V2-V6) s/p LHC w/ PCI MARTHA x2 to 100% pLAD.     #AWSTEMI  -s/p LHC s/p MARTHA x2 to pLAD  -c/w ASA/brilinta  -c/w lipitor  -c/w toprol xl 25 daily  -c/w losartan   -f/u 72 hour TTE on 12/14    #A. fib s/p ablation  -CHadsvasc 3, but currently not on AC  -currently in NSR    #T2DM  -home medication includes metformin 500 BID  -A1c 7.2%  -c/w lss    For Follow-Up:    [ ] 72hr TTE on 12/14  [ ] CKMB peaked at 46.3, f/u trop in AM       Vital Signs Last 24 Hrs  T(C): 37.2 (12 Dec 2020 22:35), Max: 37.4 (12 Dec 2020 19:00)  T(F): 98.9 (12 Dec 2020 22:35), Max: 99.3 (12 Dec 2020 19:00)  HR: 82 (12 Dec 2020 22:35) (72 - 96)  BP: 121/79 (12 Dec 2020 22:35) (93/60 - 139/73)  BP(mean): 71 (12 Dec 2020 21:00) (71 - 97)  RR: 20 (12 Dec 2020 22:35) (11 - 27)  SpO2: 95% (12 Dec 2020 22:35) (93% - 96%)  I&O's Summary    11 Dec 2020 07:01  -  12 Dec 2020 07:00  --------------------------------------------------------  IN: 820 mL / OUT: 1675 mL / NET: -855 mL    12 Dec 2020 07:01  -  12 Dec 2020 22:57  --------------------------------------------------------  IN: 600 mL / OUT: 1000 mL / NET: -400 mL          MEDICATIONS  (STANDING):  aspirin enteric coated 81 milliGRAM(s) Oral daily  atorvastatin 80 milliGRAM(s) Oral at bedtime  dextrose 40% Gel 15 Gram(s) Oral once  dextrose 50% Injectable 25 Gram(s) IV Push once  dextrose 50% Injectable 12.5 Gram(s) IV Push once  dextrose 50% Injectable 25 Gram(s) IV Push once  glucagon  Injectable 1 milliGRAM(s) IntraMuscular once  heparin   Injectable 5000 Unit(s) SubCutaneous every 8 hours  influenza   Vaccine 0.5 milliLiter(s) IntraMuscular once  insulin lispro (ADMELOG) corrective regimen sliding scale   SubCutaneous at bedtime  insulin lispro (ADMELOG) corrective regimen sliding scale   SubCutaneous three times a day before meals  losartan 25 milliGRAM(s) Oral daily  metoprolol succinate ER 25 milliGRAM(s) Oral two times a day  ticagrelor 90 milliGRAM(s) Oral every 12 hours    MEDICATIONS  (PRN):        LABS                                            13.8                  Neurophils% (auto):   70.2   (12-12 @ 03:24):    7.12 )-----------(162          Lymphocytes% (auto):  19.1                                          41.6                   Eosinphils% (auto):   1.3      Manual%: Neutrophils x    ; Lymphocytes x    ; Eosinophils x    ; Bands%: x    ; Blasts x                                    141    |  106    |  12                  Calcium: 9.2   / iCa: x      (12-12 @ 11:06)    ----------------------------<  206       Magnesium: 2.2                              3.9     |  22     |  0.69             Phosphorous: 1.9

## 2020-12-12 NOTE — PROGRESS NOTE ADULT - ASSESSMENT
54 yo M w/ hx of DM, Af s/p ablation who presented w/ CP found to have AWSTEMI s/p 2 MARTHA to pLAD     AWSTEMI  - Continue ASA, Brilinta, Lipitor, toprol and losartan   - ECHO pending, appears euvolemic. No further lasix at this time    DM  - Continue ISS

## 2020-12-13 DIAGNOSIS — I21.09 ST ELEVATION (STEMI) MYOCARDIAL INFARCTION INVOLVING OTHER CORONARY ARTERY OF ANTERIOR WALL: ICD-10-CM

## 2020-12-13 DIAGNOSIS — E11.9 TYPE 2 DIABETES MELLITUS WITHOUT COMPLICATIONS: ICD-10-CM

## 2020-12-13 DIAGNOSIS — I48.91 UNSPECIFIED ATRIAL FIBRILLATION: ICD-10-CM

## 2020-12-13 LAB
ANION GAP SERPL CALC-SCNC: 14 MMOL/L — SIGNIFICANT CHANGE UP (ref 5–17)
BUN SERPL-MCNC: 11 MG/DL — SIGNIFICANT CHANGE UP (ref 7–23)
CALCIUM SERPL-MCNC: 9.2 MG/DL — SIGNIFICANT CHANGE UP (ref 8.4–10.5)
CHLORIDE SERPL-SCNC: 106 MMOL/L — SIGNIFICANT CHANGE UP (ref 96–108)
CK MB BLD-MCNC: 4.6 % — HIGH (ref 0–3.5)
CK MB CFR SERPL CALC: 8.5 NG/ML — HIGH (ref 0–6.7)
CK SERPL-CCNC: 183 U/L — SIGNIFICANT CHANGE UP (ref 30–200)
CO2 SERPL-SCNC: 20 MMOL/L — LOW (ref 22–31)
CREAT SERPL-MCNC: 0.77 MG/DL — SIGNIFICANT CHANGE UP (ref 0.5–1.3)
GLUCOSE SERPL-MCNC: 108 MG/DL — HIGH (ref 70–99)
HCT VFR BLD CALC: 44.9 % — SIGNIFICANT CHANGE UP (ref 39–50)
HGB BLD-MCNC: 14.2 G/DL — SIGNIFICANT CHANGE UP (ref 13–17)
MAGNESIUM SERPL-MCNC: 2.2 MG/DL — SIGNIFICANT CHANGE UP (ref 1.6–2.6)
MCHC RBC-ENTMCNC: 27.5 PG — SIGNIFICANT CHANGE UP (ref 27–34)
MCHC RBC-ENTMCNC: 31.6 GM/DL — LOW (ref 32–36)
MCV RBC AUTO: 86.8 FL — SIGNIFICANT CHANGE UP (ref 80–100)
NRBC # BLD: 0 /100 WBCS — SIGNIFICANT CHANGE UP (ref 0–0)
PHOSPHATE SERPL-MCNC: 2.9 MG/DL — SIGNIFICANT CHANGE UP (ref 2.5–4.5)
PLATELET # BLD AUTO: 164 K/UL — SIGNIFICANT CHANGE UP (ref 150–400)
POTASSIUM SERPL-MCNC: 4.1 MMOL/L — SIGNIFICANT CHANGE UP (ref 3.5–5.3)
POTASSIUM SERPL-SCNC: 4.1 MMOL/L — SIGNIFICANT CHANGE UP (ref 3.5–5.3)
RBC # BLD: 5.17 M/UL — SIGNIFICANT CHANGE UP (ref 4.2–5.8)
RBC # FLD: 15.1 % — HIGH (ref 10.3–14.5)
SARS-COV-2 IGG SERPL QL IA: NEGATIVE — SIGNIFICANT CHANGE UP
SARS-COV-2 IGM SERPL IA-ACNC: <0.1 INDEX — SIGNIFICANT CHANGE UP
SODIUM SERPL-SCNC: 140 MMOL/L — SIGNIFICANT CHANGE UP (ref 135–145)
TROPONIN T, HIGH SENSITIVITY RESULT: 1404 NG/L — HIGH (ref 0–51)
WBC # BLD: 6.15 K/UL — SIGNIFICANT CHANGE UP (ref 3.8–10.5)
WBC # FLD AUTO: 6.15 K/UL — SIGNIFICANT CHANGE UP (ref 3.8–10.5)

## 2020-12-13 PROCEDURE — 99233 SBSQ HOSP IP/OBS HIGH 50: CPT

## 2020-12-13 RX ADMIN — Medication 25 MILLIGRAM(S): at 06:04

## 2020-12-13 RX ADMIN — HEPARIN SODIUM 5000 UNIT(S): 5000 INJECTION INTRAVENOUS; SUBCUTANEOUS at 13:03

## 2020-12-13 RX ADMIN — TICAGRELOR 90 MILLIGRAM(S): 90 TABLET ORAL at 17:08

## 2020-12-13 RX ADMIN — Medication 81 MILLIGRAM(S): at 11:36

## 2020-12-13 RX ADMIN — TICAGRELOR 90 MILLIGRAM(S): 90 TABLET ORAL at 06:04

## 2020-12-13 RX ADMIN — ATORVASTATIN CALCIUM 80 MILLIGRAM(S): 80 TABLET, FILM COATED ORAL at 21:28

## 2020-12-13 RX ADMIN — HEPARIN SODIUM 5000 UNIT(S): 5000 INJECTION INTRAVENOUS; SUBCUTANEOUS at 06:04

## 2020-12-13 RX ADMIN — HEPARIN SODIUM 5000 UNIT(S): 5000 INJECTION INTRAVENOUS; SUBCUTANEOUS at 21:28

## 2020-12-13 RX ADMIN — LOSARTAN POTASSIUM 25 MILLIGRAM(S): 100 TABLET, FILM COATED ORAL at 11:36

## 2020-12-13 NOTE — PROGRESS NOTE ADULT - PROBLEM SELECTOR PLAN 2
s/p ablation, reports being told no more AC. Currently in NSR  - may revisit AC with his cardiologist  - continue metoprolol as above

## 2020-12-13 NOTE — PROGRESS NOTE ADULT - ASSESSMENT
55M DM2, AF s/p ablation not on a/c admit for AWSTEMI.    #AWSTEMI  -C/w ASA/ticag  -C/w atorva 80  -C/w metop 25 and losartan 25; uptitrate as BP/HR allows  -Would consider adding spirono soon given AWSTEMI and underlying DM2  -Await TTE tomorrow w/ [facility] to eval LV fxn and r/o LV thrombus    #AF  -S/p ablation  -Should continue to discuss a/c w/ his outpt cardiologist; this may necessitate adjustment of anti-PLT regimen    #Will d/w attg  #Call w/ any Qs    Kedar Soni MD  Cardiology Fellow  [phone]  All Cardiology service information can be found 24/7 on [email or web address], password: [email or web address]

## 2020-12-13 NOTE — PROGRESS NOTE ADULT - SUBJECTIVE AND OBJECTIVE BOX
Patient seen and examined at bedside.    Overnight Events: NAEO. This AM, pt is w/o complaints. Denies CP/dyspnea/palps. No R wrist pain. No R hand weakness/numbness.    Review Of Systems: No chest pain, shortness of breath, or palpitations            Current Meds:  aspirin enteric coated 81 milliGRAM(s) Oral daily  atorvastatin 80 milliGRAM(s) Oral at bedtime  dextrose 40% Gel 15 Gram(s) Oral once  dextrose 50% Injectable 25 Gram(s) IV Push once  dextrose 50% Injectable 12.5 Gram(s) IV Push once  dextrose 50% Injectable 25 Gram(s) IV Push once  glucagon  Injectable 1 milliGRAM(s) IntraMuscular once  heparin   Injectable 5000 Unit(s) SubCutaneous every 8 hours  influenza   Vaccine 0.5 milliLiter(s) IntraMuscular once  insulin lispro (ADMELOG) corrective regimen sliding scale   SubCutaneous at bedtime  insulin lispro (ADMELOG) corrective regimen sliding scale   SubCutaneous three times a day before meals  losartan 25 milliGRAM(s) Oral daily  metoprolol succinate ER 25 milliGRAM(s) Oral daily  ticagrelor 90 milliGRAM(s) Oral every 12 hours      Vitals:  T(F): 97.6 (12-13), Max: 99.3 (12-12)  HR: 77 (12-13) (72 - 96)  BP: 103/64 (12-13) (93/60 - 121/79)  RR: 20 (12-13)  SpO2: 97% (12-13)  I&O's Summary    12 Dec 2020 07:01  -  13 Dec 2020 07:00  --------------------------------------------------------  IN: 650 mL / OUT: 1000 mL / NET: -350 mL        Physical Exam:  Gen: NAD.  HEENT: NCAT. PERRLA b/l.  Neck: No JVP elev.  CV: Normal S1, S2. RRR. No MRG.  Chest: CTAB. No WRR.  Abd: +BSx4. Soft. NTND.  Ext: No LE edema. R radial site CDI. Varicose veins LEs.  Skin: No cyanosis.                          14.2   6.15  )-----------( 164      ( 13 Dec 2020 07:16 )             44.9     12-13    140  |  106  |  11  ----------------------------<  108<H>  4.1   |  20<L>  |  0.77    Ca    9.2      13 Dec 2020 07:15  Phos  2.9     12-13  Mg     2.2     12-13        CARDIAC MARKERS ( 12 Dec 2020 11:06 )  2122 ng/L / x     / x     / 464 U/L / x     / 36.1 ng/mL  CARDIAC MARKERS ( 12 Dec 2020 03:24 )  1778 ng/L / x     / x     / 558 U/L / x     / 46.3 ng/mL  CARDIAC MARKERS ( 11 Dec 2020 21:07 )  1464 ng/L / x     / x     / 518 U/L / x     / 44.6 ng/mL  CARDIAC MARKERS ( 11 Dec 2020 13:25 )  883 ng/L / x     / x     / 354 U/L / x     / 30.6 ng/mL  CARDIAC MARKERS ( 11 Dec 2020 06:13 )  156 ng/L / x     / x     / 217 U/L / x     / 14.1 ng/mL      Serum Pro-Brain Natriuretic Peptide: 53 pg/mL (12-11 @ 06:13)    Interpretation of Telemetry: LINDEN

## 2020-12-13 NOTE — PROGRESS NOTE ADULT - SUBJECTIVE AND OBJECTIVE BOX
Urbano Dimas MD  Division of Hospital Medicine  Pager: [phone]  If no response or off-hours, page [phone]  -------------------------------------    Patient is a 55y old  Male who presents with a chief complaint of STEMI (13 Dec 2020 07:57)      SUBJECTIVE / OVERNIGHT EVENTS: none acute  ADDITIONAL REVIEW OF SYSTEMS: pt cp free, eager to undergo TTE and go home. no new complaints, no tele events, ros otherwise negative.     MEDICATIONS  (STANDING):  aspirin enteric coated 81 milliGRAM(s) Oral daily  atorvastatin 80 milliGRAM(s) Oral at bedtime  dextrose 40% Gel 15 Gram(s) Oral once  dextrose 50% Injectable 25 Gram(s) IV Push once  dextrose 50% Injectable 12.5 Gram(s) IV Push once  dextrose 50% Injectable 25 Gram(s) IV Push once  glucagon  Injectable 1 milliGRAM(s) IntraMuscular once  heparin   Injectable 5000 Unit(s) SubCutaneous every 8 hours  influenza   Vaccine 0.5 milliLiter(s) IntraMuscular once  insulin lispro (ADMELOG) corrective regimen sliding scale   SubCutaneous at bedtime  insulin lispro (ADMELOG) corrective regimen sliding scale   SubCutaneous three times a day before meals  losartan 25 milliGRAM(s) Oral daily  metoprolol succinate ER 25 milliGRAM(s) Oral daily  ticagrelor 90 milliGRAM(s) Oral every 12 hours    MEDICATIONS  (PRN):      CAPILLARY BLOOD GLUCOSE      POCT Blood Glucose.: 130 mg/dL (13 Dec 2020 13:27)  POCT Blood Glucose.: 125 mg/dL (13 Dec 2020 09:03)  POCT Blood Glucose.: 123 mg/dL (12 Dec 2020 21:15)  POCT Blood Glucose.: 111 mg/dL (12 Dec 2020 17:28)  POCT Blood Glucose.: 158 mg/dL (12 Dec 2020 13:52)    I&O's Summary    12 Dec 2020 07:01  -  13 Dec 2020 07:00  --------------------------------------------------------  IN: 650 mL / OUT: 1000 mL / NET: -350 mL        PHYSICAL EXAM:  Vital Signs Last 24 Hrs  T(C): 36.7 (13 Dec 2020 11:33), Max: 37.4 (12 Dec 2020 19:00)  T(F): 98.1 (13 Dec 2020 11:33), Max: 99.3 (12 Dec 2020 19:00)  HR: 78 (13 Dec 2020 11:33) (77 - 96)  BP: 90/63 (13 Dec 2020 11:33) (90/63 - 121/79)  BP(mean): 71 (12 Dec 2020 21:00) (71 - 89)  RR: 18 (13 Dec 2020 11:33) (18 - 23)  SpO2: 95% (13 Dec 2020 11:33) (93% - 97%)  CONSTITUTIONAL: NAD, well-developed, well-groomed  EYES: PERRLA; conjunctiva and sclera clear  ENMT: Moist oral mucosa, no pharyngeal injection or exudates; normal dentition  NECK: Supple, no palpable masses; no thyromegaly  RESPIRATORY: Normal respiratory effort; lungs are clear to auscultation bilaterally  CARDIOVASCULAR: Regular rate and rhythm, normal S1 and S2, no murmur/rub/gallop; No lower extremity edema; Peripheral pulses are 2+ bilaterally  ABDOMEN: Nontender to palpation, normoactive bowel sounds, no rebound/guarding; No hepatosplenomegaly  MUSCLOSKELETAL:  Normal gait; no clubbing or cyanosis of digits; no joint swelling or tenderness to palpation  PSYCH: A+O to person, place, and time; affect appropriate  NEUROLOGY: CN 2-12 are intact and symmetric; no gross sensory deficits;   SKIN: No rashes; no palpable lesions    LABS:                        14.2   6.15  )-----------( 164      ( 13 Dec 2020 07:16 )             44.9     12-13    140  |  106  |  11  ----------------------------<  108<H>  4.1   |  20<L>  |  0.77    Ca    9.2      13 Dec 2020 07:15  Phos  2.9     12-13  Mg     2.2     12-13        CARDIAC MARKERS ( 13 Dec 2020 07:15 )  x     / x     / 183 U/L / x     / 8.5 ng/mL  CARDIAC MARKERS ( 12 Dec 2020 11:06 )  x     / x     / 464 U/L / x     / 36.1 ng/mL  CARDIAC MARKERS ( 12 Dec 2020 03:24 )  x     / x     / 558 U/L / x     / 46.3 ng/mL  CARDIAC MARKERS ( 11 Dec 2020 21:07 )  x     / x     / 518 U/L / x     / 44.6 ng/mL            RADIOLOGY & ADDITIONAL TESTS:  Results Reviewed:   Imaging Personally Reviewed:  Electrocardiogram Personally Reviewed:    COORDINATION OF CARE:  Care Discussed with Consultants/Other Providers [Y/N]:  Prior or Outpatient Records Reviewed [Y/N]:

## 2020-12-13 NOTE — PROGRESS NOTE ADULT - PROBLEM SELECTOR PLAN 1
s/p LHC with MARTHA x 2 to pLAD, now CP free  - continue aspirin/brilinta/statin  - continue metop/losartan  - f/u 72 hr TTE r/o LV clot on 12/14  - cards following

## 2020-12-14 ENCOUNTER — TRANSCRIPTION ENCOUNTER (OUTPATIENT)
Age: 55
End: 2020-12-14

## 2020-12-14 VITALS
RESPIRATION RATE: 18 BRPM | DIASTOLIC BLOOD PRESSURE: 73 MMHG | OXYGEN SATURATION: 96 % | SYSTOLIC BLOOD PRESSURE: 112 MMHG | HEART RATE: 76 BPM | TEMPERATURE: 99 F

## 2020-12-14 LAB
ANION GAP SERPL CALC-SCNC: 13 MMOL/L — SIGNIFICANT CHANGE UP (ref 5–17)
BUN SERPL-MCNC: 12 MG/DL — SIGNIFICANT CHANGE UP (ref 7–23)
CALCIUM SERPL-MCNC: 9.1 MG/DL — SIGNIFICANT CHANGE UP (ref 8.4–10.5)
CHLORIDE SERPL-SCNC: 104 MMOL/L — SIGNIFICANT CHANGE UP (ref 96–108)
CO2 SERPL-SCNC: 21 MMOL/L — LOW (ref 22–31)
CREAT SERPL-MCNC: 0.81 MG/DL — SIGNIFICANT CHANGE UP (ref 0.5–1.3)
GLUCOSE SERPL-MCNC: 125 MG/DL — HIGH (ref 70–99)
MAGNESIUM SERPL-MCNC: 2.2 MG/DL — SIGNIFICANT CHANGE UP (ref 1.6–2.6)
PHOSPHATE SERPL-MCNC: 3.4 MG/DL — SIGNIFICANT CHANGE UP (ref 2.5–4.5)
POTASSIUM SERPL-MCNC: 3.7 MMOL/L — SIGNIFICANT CHANGE UP (ref 3.5–5.3)
POTASSIUM SERPL-SCNC: 3.7 MMOL/L — SIGNIFICANT CHANGE UP (ref 3.5–5.3)
SODIUM SERPL-SCNC: 138 MMOL/L — SIGNIFICANT CHANGE UP (ref 135–145)
TROPONIN T, HIGH SENSITIVITY RESULT: 1154 NG/L — HIGH (ref 0–51)

## 2020-12-14 PROCEDURE — C1725: CPT

## 2020-12-14 PROCEDURE — 99232 SBSQ HOSP IP/OBS MODERATE 35: CPT

## 2020-12-14 PROCEDURE — 80061 LIPID PANEL: CPT

## 2020-12-14 PROCEDURE — C1769: CPT

## 2020-12-14 PROCEDURE — 84484 ASSAY OF TROPONIN QUANT: CPT

## 2020-12-14 PROCEDURE — C1894: CPT

## 2020-12-14 PROCEDURE — 83036 HEMOGLOBIN GLYCOSYLATED A1C: CPT

## 2020-12-14 PROCEDURE — 80048 BASIC METABOLIC PNL TOTAL CA: CPT

## 2020-12-14 PROCEDURE — 84443 ASSAY THYROID STIM HORMONE: CPT

## 2020-12-14 PROCEDURE — 93306 TTE W/DOPPLER COMPLETE: CPT | Mod: 26

## 2020-12-14 PROCEDURE — 85610 PROTHROMBIN TIME: CPT

## 2020-12-14 PROCEDURE — 82550 ASSAY OF CK (CPK): CPT

## 2020-12-14 PROCEDURE — 83735 ASSAY OF MAGNESIUM: CPT

## 2020-12-14 PROCEDURE — C9606: CPT | Mod: LD

## 2020-12-14 PROCEDURE — C8929: CPT

## 2020-12-14 PROCEDURE — 84100 ASSAY OF PHOSPHORUS: CPT

## 2020-12-14 PROCEDURE — 93458 L HRT ARTERY/VENTRICLE ANGIO: CPT | Mod: 59

## 2020-12-14 PROCEDURE — 86901 BLOOD TYPING SEROLOGIC RH(D): CPT

## 2020-12-14 PROCEDURE — 85730 THROMBOPLASTIN TIME PARTIAL: CPT

## 2020-12-14 PROCEDURE — 87635 SARS-COV-2 COVID-19 AMP PRB: CPT

## 2020-12-14 PROCEDURE — 86900 BLOOD TYPING SEROLOGIC ABO: CPT

## 2020-12-14 PROCEDURE — 86769 SARS-COV-2 COVID-19 ANTIBODY: CPT

## 2020-12-14 PROCEDURE — 85025 COMPLETE CBC W/AUTO DIFF WBC: CPT

## 2020-12-14 PROCEDURE — 82553 CREATINE MB FRACTION: CPT

## 2020-12-14 PROCEDURE — C1887: CPT

## 2020-12-14 PROCEDURE — 99239 HOSP IP/OBS DSCHRG MGMT >30: CPT

## 2020-12-14 PROCEDURE — C1757: CPT

## 2020-12-14 PROCEDURE — 82962 GLUCOSE BLOOD TEST: CPT

## 2020-12-14 PROCEDURE — 83880 ASSAY OF NATRIURETIC PEPTIDE: CPT

## 2020-12-14 PROCEDURE — 86850 RBC ANTIBODY SCREEN: CPT

## 2020-12-14 PROCEDURE — 93005 ELECTROCARDIOGRAM TRACING: CPT

## 2020-12-14 PROCEDURE — C1874: CPT

## 2020-12-14 PROCEDURE — 85027 COMPLETE CBC AUTOMATED: CPT

## 2020-12-14 RX ORDER — METFORMIN HYDROCHLORIDE 850 MG/1
1 TABLET ORAL
Qty: 0 | Refills: 0 | DISCHARGE

## 2020-12-14 RX ORDER — ASPIRIN/CALCIUM CARB/MAGNESIUM 324 MG
1 TABLET ORAL
Qty: 30 | Refills: 0
Start: 2020-12-14 | End: 2021-01-12

## 2020-12-14 RX ORDER — ATORVASTATIN CALCIUM 80 MG/1
1 TABLET, FILM COATED ORAL
Qty: 30 | Refills: 0
Start: 2020-12-14 | End: 2021-01-12

## 2020-12-14 RX ORDER — METOPROLOL TARTRATE 50 MG
1 TABLET ORAL
Qty: 30 | Refills: 0
Start: 2020-12-14 | End: 2021-01-12

## 2020-12-14 RX ORDER — TICAGRELOR 90 MG/1
1 TABLET ORAL
Qty: 60 | Refills: 0
Start: 2020-12-14 | End: 2021-01-12

## 2020-12-14 RX ORDER — LOSARTAN POTASSIUM 100 MG/1
1 TABLET, FILM COATED ORAL
Qty: 30 | Refills: 0
Start: 2020-12-14 | End: 2021-01-12

## 2020-12-14 RX ADMIN — TICAGRELOR 90 MILLIGRAM(S): 90 TABLET ORAL at 17:11

## 2020-12-14 RX ADMIN — TICAGRELOR 90 MILLIGRAM(S): 90 TABLET ORAL at 05:50

## 2020-12-14 RX ADMIN — Medication 81 MILLIGRAM(S): at 13:06

## 2020-12-14 RX ADMIN — Medication 25 MILLIGRAM(S): at 05:50

## 2020-12-14 RX ADMIN — LOSARTAN POTASSIUM 25 MILLIGRAM(S): 100 TABLET, FILM COATED ORAL at 13:06

## 2020-12-14 RX ADMIN — HEPARIN SODIUM 5000 UNIT(S): 5000 INJECTION INTRAVENOUS; SUBCUTANEOUS at 05:51

## 2020-12-14 RX ADMIN — HEPARIN SODIUM 5000 UNIT(S): 5000 INJECTION INTRAVENOUS; SUBCUTANEOUS at 13:06

## 2020-12-14 NOTE — PROGRESS NOTE ADULT - ASSESSMENT
55M w/ DM, AFib s/p ablation (not on AC) presented to [facility] with 10/10 chest pain waking him from sleep at 3am. Found to have SABIHA V2-V6, and transferred to [facility] for cath. S/p PCI DESx2 to 100% pLAD. Currently, HDS and asymptoamtic.     Recs:  - echo today  - con't ASA, Brillinta, statin  - inc Toprol XL to 50mg daily  - con't losartan 90  - Please arrange follow up with primary cardiologist within 7-10 days of discharge.     Ambrocio Sethi MD  Cardiology Fellow PGY-4  [facility]    Notes are not final until signed by attending  For all consults and questions:  [email or web address]   Login: [email or web address]   55M w/ DM, AFib s/p ablation (not on AC) presented to [facility] with 10/10 chest pain waking him from sleep at 3am. Found to have SABIHA V2-V6, and transferred to [facility] for cath. S/p PCI DESx2 to 100% pLAD. Currently, HDS and asymptoamtic.     Recs:  - echo today  - con't ASA, Brillinta, statin  - inc Toprol XL to 50mg daily  - con't losartan  - Please arrange follow up with primary cardiologist within 7-10 days of discharge.     Ambrocio Sethi MD  Cardiology Fellow PGY-4  [facility]    Notes are not final until signed by attending  For all consults and questions:  [email or web address]   Login: [email or web address]

## 2020-12-14 NOTE — DISCHARGE NOTE PROVIDER - NSDCFUADDAPPT_GEN_ALL_CORE_FT
Please follow up with PMD in  1-2 weeks   Please follow up with Cardiology in 1-2 weeks after discharge regarding Anticoagulation

## 2020-12-14 NOTE — DISCHARGE NOTE PROVIDER - HOSPITAL COURSE
55M DM2, AF s/p ablation not on a/c admit for AWSTEMI now s/p PCI with MARTHA x 2 to pLAD     Problem/Plan - 1:  ·  Problem: Anterior wall myocardial infarction.  Plan: s/p LHC with MARTHA x 2 to pLAD, now CP free  - continue aspirin/brilinta/statin  - continue metop/losartan  - f/u 72 hr TTE r/o LV clot on 12/14  - cards following.      Problem/Plan - 2:  ·  Problem: Afib.  Plan: s/p ablation, reports being told no more AC. Currently in NSR  - may revisit AC with his cardiologist  - continue metoprolol as above.      Problem/Plan - 3:  ·  Problem: Diabetes.  Plan: continue sliding scale and fingerstick monitoring  - resume OP f/u    dispo: likely home pending TTE tomorrow.    55M DM2, AF s/p ablation not on a/c admit for AWSTEMI now s/p PCI with MARTHA x 2 to pLAD  : Anterior wall myocardial infarction.  Plan: s/p LHC with MARTHA x 2 to pLAD, now CP free  - continue aspirin/brilinta/statin  - continue metop/losartan  Cardiology appreciated    Problem/Plan - 2:  ·  Problem: Afib.  Plan: s/p ablation, reports being told no more AC. Currently in NSR  - may revisit AC with his cardiologist  - continue metoprolol as above.      Problem/Plan - 3:  ·  Problem: Diabetes.  Plan: continue sliding scale and fingerstick monitoring  - resume OP f/u    dispo: likely home pending TTE tomorrow.   55M DM2, AF s/p ablation not on a/c admit for AWSTEMI now s/p PCI with MARTHA x 2 to pLAD     Problem/Plan - 1:  ·  Problem: Anterior wall myocardial infarction.  Plan: s/p LHC with MARTHA x 2 to pLAD, now CP free  - continue aspirin/brilinta/statin  - continue metop/losartan  - f/u 72 hr TTE r/o LV clot on 12/14  - cards following.      Problem/Plan - 2:  ·  Problem: Afib.  Plan: s/p ablation, reports being told no more AC. Currently in NSR  - may revisit AC with his cardiologist  - continue metoprolol as above.      Problem/Plan - 3:  ·  Problem: Diabetes.  Plan: continue sliding scale and fingerstick monitoring  - resume OP f/u    dispo: likely home pending TTE tomorrow.    55M DM2, AF s/p ablation not on a/c admit for AWSTEMI now s/p PCI with MARTHA x 2 to pLAD  : Anterior wall myocardial infarction.  Plan: s/p LHC with MARTHA x 2 to pLAD, now CP free  - continue aspirin/brilinta/statin  - continue metop/losartan  Cardiology appreciated   Pending Echo 55M DM2, AF s/p ablation not on a/c admit for AWSTEMI now s/p PCI with MARTHA x 2 to pLAD  : Anterior wall myocardial infarction. status post  LHC with MARTHA x 2 to pLAD, now CP free  - continue aspirin/brilinta/statin  - continue metop/losartan  Cardiology appreciated   Echo no thrombus patient stable for discharge

## 2020-12-14 NOTE — DISCHARGE NOTE PROVIDER - NSDCCPCAREPLAN_GEN_ALL_CORE_FT
PRINCIPAL DISCHARGE DIAGNOSIS  Diagnosis: Anterior wall myocardial infarction  Assessment and Plan of Treatment: Low salt, low fat diet.   Weight management.   Take medications as prescribed.    No smoking.  Follow up appointments with your doctor(s)  as instruced.        SECONDARY DISCHARGE DIAGNOSES  Diagnosis: Afib  Assessment and Plan of Treatment:      PRINCIPAL DISCHARGE DIAGNOSIS  Diagnosis: Anterior wall myocardial infarction  Assessment and Plan of Treatment: Low salt, low fat diet.   Weight management.   Take medications as prescribed.    No smoking.  Follow up appointments with your doctor(s)  as instruced.        SECONDARY DISCHARGE DIAGNOSES  Diagnosis: DM2 (diabetes mellitus, type 2)  Assessment and Plan of Treatment: HgA1C this admission. 7.2 %   Make sure you get your HgA1c checked every three months.  If you take oral diabetes medications, check your blood glucose two times a day.  If you take insulin, check your blood glucose before meals and at bedtime.  It's important not to skip any meals.  Keep a log of your blood glucose results and always take it with you to your doctor appointments.  Keep a list of your current medications including injectables and over the counter medications and bring this medication list with you to all your doctor appointments.  If you have not seen your opthalmologist this year call for appointment.  Check your feet daily for redness, sores, or openings. Do not self treat. If no improvement in two days call your primary care physician for an appointment.  Low blood sugar (hypoglycemia) is a blood sugar below 70mg/dl. Check your blood sugar if you feel signs/symptoms of hypoglycemia. If your blood sugar is below 70 take 15 grams of carbohydrates (ex 4 oz of apple juice, 3-4 glucosr tablets, or 4-6 oz of regular soda) wait 15 minutes and repeat blood sugar to make sure it comes up above 70.  If your blood sugar is above 70 and you are due for a meal, have a meal.  If you are not due for a meal have a snack.  This snack helps keeps your blood sugar at a safe range.      Diagnosis: Afib  Assessment and Plan of Treatment: please follow up with cardiology outpatient need to continue anticoagulation

## 2020-12-14 NOTE — PROGRESS NOTE ADULT - SUBJECTIVE AND OBJECTIVE BOX
Patient seen and examined at bedside.    Overnight Events: no events, issues or complaints    Review of Systems:  REVIEW OF SYSTEMS:  CONSTITUTIONAL: No weakness, fevers or chills  EYES/ENT: No visual changes;  No dysphagia  NECK: No pain or stiffness  RESPIRATORY: No cough, wheezing, hemoptysis; No shortness of breath  CARDIOVASCULAR: No chest pain or palpitations; No lower extremity edema  GASTROINTESTINAL: No abdominal or epigastric pain. No nausea, vomiting, or hematemesis; No diarrhea or constipation. No melena or hematochezia.  BACK: No back pain  GENITOURINARY: No dysuria, frequency or hematuria  NEUROLOGICAL: No numbness or weakness  SKIN: No itching, burning, rashes, or lesions   All other review of systems is negative unless indicated above.    [ x] All other systems negative  [ ] Unable to assess ROS due to    Current Meds:  aspirin enteric coated 81 milliGRAM(s) Oral daily  atorvastatin 80 milliGRAM(s) Oral at bedtime  dextrose 40% Gel 15 Gram(s) Oral once  dextrose 50% Injectable 25 Gram(s) IV Push once  dextrose 50% Injectable 12.5 Gram(s) IV Push once  dextrose 50% Injectable 25 Gram(s) IV Push once  glucagon  Injectable 1 milliGRAM(s) IntraMuscular once  heparin   Injectable 5000 Unit(s) SubCutaneous every 8 hours  influenza   Vaccine 0.5 milliLiter(s) IntraMuscular once  insulin lispro (ADMELOG) corrective regimen sliding scale   SubCutaneous at bedtime  insulin lispro (ADMELOG) corrective regimen sliding scale   SubCutaneous three times a day before meals  losartan 25 milliGRAM(s) Oral daily  metoprolol succinate ER 25 milliGRAM(s) Oral daily  ticagrelor 90 milliGRAM(s) Oral every 12 hours      PAST MEDICAL & SURGICAL HISTORY:      Vitals:  T(F): 98 (12-14), Max: 98.1 (12-13)  HR: 79 (12-14) (78 - 92)  BP: 102/66 (12-14) (90/63 - 112/71)  RR: 18 (12-14)  SpO2: 99% (12-14)  I&O's Summary    13 Dec 2020 07:01  -  14 Dec 2020 07:00  --------------------------------------------------------  IN: 100 mL / OUT: 0 mL / NET: 100 mL        Physical Exam:  Appearance: No acute distress; well appearing  Eyes: PERRL, EOMI, pink conjunctiva  HENT: Normal oral mucosa  Cardiovascular: RRR, S1, S2, no murmurs, rubs, or gallops; no edema; no JVD  Respiratory: Clear to auscultation bilaterally  Gastrointestinal: soft, non-tender, non-distended with normal bowel sounds  Musculoskeletal: No clubbing; no joint deformity   Neurologic: Non-focal  Lymphatic: No lymphadenopathy  Psychiatry: AAOx3, mood & affect appropriate  Skin: No rashes, ecchymoses, or cyanosis                          14.2   6.15  )-----------( 164      ( 13 Dec 2020 07:16 )             44.9     12-14    138  |  104  |  12  ----------------------------<  125<H>  3.7   |  21<L>  |  0.81    Ca    9.1      14 Dec 2020 06:57  Phos  3.4     12-14  Mg     2.2     12-14        CARDIAC MARKERS ( 13 Dec 2020 07:15 )  x     / x     / 183 U/L / x     / 8.5 ng/mL  CARDIAC MARKERS ( 12 Dec 2020 11:06 )  x     / x     / 464 U/L / x     / 36.1 ng/mL      Serum Pro-Brain Natriuretic Peptide: 53 pg/mL (12-11 @ 06:13)        < from: Cardiac Cath Lab - Adult (12.11.20 @ 04:20) >  CORONARY VESSELS: The coronary circulation is co-dominant.  LM:   --  LM: Normal.  LAD:   --  Proximal LAD: There was a 100 % stenosis.  --  Distal LAD: There was a 90 % stenosis.  CX:   --  Distal circumflex: Angiography showed moderate atherosclerosis.  --  OM1: Angiography showed mild atherosclerosis with no flow limiting  lesions. There was a 20 % stenosis.  RCA:   --  Proximal RCA: There was a 50 % stenosis.  COMPLICATIONS: There were no complications.  DIAGNOSTIC RECOMMENDATIONS: Successful PCI to the LAD with MARTHA.  DAPT for one year in setting of ACS.    < end of copied text >

## 2020-12-14 NOTE — CHART NOTE - NSCHARTNOTEFT_GEN_A_CORE
Patient is a 55y old  Male who presents with a chief complaint of CP (13 Dec 2020 13:47)      Vital Signs Last 24 Hrs  T(C): 37.3 (14 Dec 2020 12:00), Max: 37.3 (14 Dec 2020 12:00)  T(F): 99.1 (14 Dec 2020 12:00), Max: 99.1 (14 Dec 2020 12:00)  HR: 76 (14 Dec 2020 12:00) (76 - 92)  BP: 112/73 (14 Dec 2020 12:00) (102/66 - 112/73)  BP(mean): --  RR: 18 (14 Dec 2020 12:00) (18 - 18)  SpO2: 96% (14 Dec 2020 12:00) (95% - 99%)      Labs:                          14.2   6.15  )-----------( 164      ( 13 Dec 2020 07:16 )             44.9     12-14    138  |  104  |  12  ----------------------------<  125<H>  3.7   |  21<L>  |  0.81    Ca    9.1      14 Dec 2020 06:57  Phos  3.4     12-14  Mg     2.2     12-14      CARDIAC MARKERS ( 13 Dec 2020 07:15 )  x     / x     / 183 U/L / x     / 8.5 ng/mL          Radiology:    Physical Exam:  General: WN/WD NAD  Neurology: A&Ox3, nonfocal, MERAZ x 4  Head:  Normocephalic, atraumatic  Respiratory: CTA B/L  CV: RRR, S1S2, no murmur  Abdominal: Soft, NT, ND no palpable mass  MSK: No edema, + peripheral pulses, FROM all 4 extremity    Discharge Note and Plan: Patient medically stable for discharge   reviewed medication with Dr.Luke Machado without Thrombus   >Follow up with   >  >  >

## 2020-12-14 NOTE — CHART NOTE - NSCHARTNOTEFT_GEN_A_CORE
patient seen and examined. denies chest pain or sob.     55M h/o DM2, AF s/p ablation not on a/c admitted with AWSTEMI s/p MARTHA x 2 to pLAD. TTE with EF 45% and no LV thrombus. patient to continue asa, brilinta, statin, metoprolol, losartan. a1c 7.2 - patient to restart home metformin 500mg bidon 12/15.  patient to follow up with pmd and cardiology in 1-2 weeks.     discharge time - 36 minutes  Dr. M. Luke  Medicine Hospitalist  [phone]

## 2020-12-14 NOTE — DISCHARGE NOTE PROVIDER - NSDCMRMEDTOKEN_GEN_ALL_CORE_FT
aspirin 81 mg oral delayed release tablet: 1 tab(s) orally once a day  atorvastatin 80 mg oral tablet: 1 tab(s) orally once a day (at bedtime)  losartan 25 mg oral tablet: 1 tab(s) orally once a day  Metoprolol Succinate ER 50 mg oral tablet, extended release: 1 tab(s) orally once a day   ticagrelor 90 mg oral tablet: 1 tab(s) orally every 12 hours   aspirin 81 mg oral delayed release tablet: 1 tab(s) orally once a day  atorvastatin 80 mg oral tablet: 1 tab(s) orally once a day (at bedtime)  losartan 25 mg oral tablet: 1 tab(s) orally once a day  metFORMIN 500 mg oral tablet: 1 tab(s) orally 2 times a day   ******restart 48 hours after Cardiac Cath Restart 12/15 /2020 in morning   Metoprolol Succinate ER 50 mg oral tablet, extended release: 1 tab(s) orally once a day   ticagrelor 90 mg oral tablet: 1 tab(s) orally every 12 hours

## 2023-05-11 NOTE — PROGRESS NOTE ADULT - PROBLEM SELECTOR PLAN 3
<-- Click to add NO significant Past Surgical History continue sliding scale and fingerstick monitoring  - resume OP f/u    dispo: likely home pending TTE tomorrow

## 2024-02-06 ENCOUNTER — APPOINTMENT (OUTPATIENT)
Dept: CT IMAGING | Facility: CLINIC | Age: 59
End: 2024-02-06
Payer: COMMERCIAL

## 2024-02-06 ENCOUNTER — OUTPATIENT (OUTPATIENT)
Dept: OUTPATIENT SERVICES | Facility: HOSPITAL | Age: 59
LOS: 1 days | End: 2024-02-06
Payer: COMMERCIAL

## 2024-02-06 ENCOUNTER — RESULT REVIEW (OUTPATIENT)
Age: 59
End: 2024-02-06

## 2024-02-06 DIAGNOSIS — Z00.00 ENCOUNTER FOR GENERAL ADULT MEDICAL EXAMINATION WITHOUT ABNORMAL FINDINGS: ICD-10-CM

## 2024-02-06 PROCEDURE — 74177 CT ABD & PELVIS W/CONTRAST: CPT

## 2024-02-06 PROCEDURE — 74177 CT ABD & PELVIS W/CONTRAST: CPT | Mod: 26

## 2024-02-13 ENCOUNTER — TRANSCRIPTION ENCOUNTER (OUTPATIENT)
Age: 59
End: 2024-02-13

## 2024-02-15 ENCOUNTER — APPOINTMENT (OUTPATIENT)
Dept: ULTRASOUND IMAGING | Facility: CLINIC | Age: 59
End: 2024-02-15
Payer: COMMERCIAL

## 2024-02-15 PROCEDURE — 76775 US EXAM ABDO BACK WALL LIM: CPT

## 2024-02-27 ENCOUNTER — APPOINTMENT (OUTPATIENT)
Dept: SURGERY | Facility: CLINIC | Age: 59
End: 2024-02-27
Payer: COMMERCIAL

## 2024-02-27 VITALS
DIASTOLIC BLOOD PRESSURE: 76 MMHG | OXYGEN SATURATION: 98 % | HEART RATE: 86 BPM | SYSTOLIC BLOOD PRESSURE: 124 MMHG | HEIGHT: 72 IN | TEMPERATURE: 98.7 F | BODY MASS INDEX: 34.54 KG/M2 | RESPIRATION RATE: 17 BRPM | WEIGHT: 255 LBS

## 2024-02-27 DIAGNOSIS — Z86.79 PERSONAL HISTORY OF OTHER DISEASES OF THE CIRCULATORY SYSTEM: ICD-10-CM

## 2024-02-27 DIAGNOSIS — K43.2 INCISIONAL HERNIA W/OUT OBSTRUCTION OR GANGRENE: ICD-10-CM

## 2024-02-27 PROCEDURE — 99204 OFFICE O/P NEW MOD 45 MIN: CPT

## 2024-02-27 RX ORDER — EVOLOCUMAB 420 MG/3.5
420 KIT SUBCUTANEOUS
Refills: 0 | Status: ACTIVE | COMMUNITY

## 2024-02-27 RX ORDER — PSYLLIUM HUSK 0.4 G
CAPSULE ORAL
Refills: 0 | Status: ACTIVE | COMMUNITY

## 2024-02-27 RX ORDER — METFORMIN HYDROCHLORIDE 625 MG/1
TABLET ORAL
Refills: 0 | Status: ACTIVE | COMMUNITY

## 2024-02-27 NOTE — HISTORY OF PRESENT ILLNESS
[de-identified] : NISHA  is a 59 year  male, BMI 34, history coronary stents on Brilinta, on metformin for diabetes here for evaluation of ventral hernia.  He states he has had approximately 8 hernia repairs in the past including umbilical, supraumbilical, and bilateral inguinal hernias.  He has had 2 surgeries that he knows of for hernias around his umbilicus, both by Dr. Govea at [facility].  He reports now this bulge growing over the last few years.  And intermittently gets harder with mild pain but never severe pain or change in his skin color.  This hernia was workup for GI symptoms.

## 2024-02-27 NOTE — DATA REVIEWED
[FreeTextEntry1] : CT a/p earlier this month reviewed with recurrent supraumbilical hernia.  Obvious mesh and tacks at umbilicus.  This hernia appears to be superior to that repair.

## 2024-02-27 NOTE — PHYSICAL EXAM
[Alert] : alert [Oriented to Person] : oriented to person [Oriented to Place] : oriented to place [Oriented to Time] : oriented to time [Calm] : calm [de-identified] : Well-appearing [de-identified] : Nonlabored respirations on room air [de-identified] : Regular rate [de-identified] : Soft, nontender, nondistended.  non-reducible supraumbilical incisional hernia, with about a baseball size hernia contents.  Nontender, no overlying skin changes.

## 2024-02-27 NOTE — ASSESSMENT
[FreeTextEntry1] : 59-year-old male, BMI 43, CAD status post stents on Brilinta, diabetes on metformin, history of at least 2 ventral/umbilical hernia repairs, now with recurrence likely incisional hernia.

## 2024-02-27 NOTE — PLAN
[FreeTextEntry1] : I had a long discussion with the patient about likely hernia repair.  I do need to review the operative reports from his 2 prior ventral hernia repairs, had him sign an MINOR release form and we will attempt to get these operative reports.  Given that this is likely a recurrence, repair will be more difficult.  Thankfully the hernia appears to be fat-containing, so there is no emergent reason to repair.  I would like to review his operative reports in detail before deciding how to repair, although I would like to do it minimally invasively.  I also discussed that weight loss will help with not only the repair, but will also likely decrease his recurrence rate.  I do think that given his diabetes he would be a good candidate for a GLP-1.  He is up for this.  He is currently switching over his health care to [facility], and has an appointment with a new PCP in the next month.  I will message her and see if she agrees that he would be a good candidate for GLP-1 not only for his diabetes but also to help aid with weight loss prior to recurrent incisional hernia repair.  All questions answered.  Patient will schedule appointment with me to discuss further surgical planning after his PCP appointment.  Danielle Garcia MD  [facility]  [address] [place] [address] [address] [place] [address]  tel. [phone]  fax [phone]

## 2024-03-26 ENCOUNTER — NON-APPOINTMENT (OUTPATIENT)
Age: 59
End: 2024-03-26

## 2024-03-26 ENCOUNTER — APPOINTMENT (OUTPATIENT)
Dept: INTERNAL MEDICINE | Facility: CLINIC | Age: 59
End: 2024-03-26
Payer: COMMERCIAL

## 2024-03-26 VITALS
HEIGHT: 72 IN | DIASTOLIC BLOOD PRESSURE: 70 MMHG | BODY MASS INDEX: 33.46 KG/M2 | TEMPERATURE: 98.1 F | WEIGHT: 247 LBS | OXYGEN SATURATION: 97 % | SYSTOLIC BLOOD PRESSURE: 120 MMHG

## 2024-03-26 DIAGNOSIS — K43.9 VENTRAL HERNIA W/OUT OBSTRUCTION OR GANGRENE: ICD-10-CM

## 2024-03-26 DIAGNOSIS — Z78.9 OTHER SPECIFIED HEALTH STATUS: ICD-10-CM

## 2024-03-26 DIAGNOSIS — Z86.19 PERSONAL HISTORY OF OTHER INFECTIOUS AND PARASITIC DISEASES: ICD-10-CM

## 2024-03-26 DIAGNOSIS — Z00.00 ENCOUNTER FOR GENERAL ADULT MEDICAL EXAMINATION W/OUT ABNORMAL FINDINGS: ICD-10-CM

## 2024-03-26 PROCEDURE — 99396 PREV VISIT EST AGE 40-64: CPT

## 2024-03-26 PROCEDURE — 99203 OFFICE O/P NEW LOW 30 MIN: CPT

## 2024-03-26 PROCEDURE — G0444 DEPRESSION SCREEN ANNUAL: CPT | Mod: 59

## 2024-03-26 PROCEDURE — 93000 ELECTROCARDIOGRAM COMPLETE: CPT | Mod: 59

## 2024-03-26 PROCEDURE — G2211 COMPLEX E/M VISIT ADD ON: CPT | Mod: NC

## 2024-03-26 PROCEDURE — 36415 COLL VENOUS BLD VENIPUNCTURE: CPT

## 2024-03-26 RX ORDER — EVOLOCUMAB 140 MG/ML
140 INJECTION, SOLUTION SUBCUTANEOUS
Qty: 7 | Refills: 1 | Status: ACTIVE | COMMUNITY
Start: 2024-03-26 | End: 1900-01-01

## 2024-03-26 RX ORDER — TICAGRELOR 60 MG/1
60 TABLET ORAL TWICE DAILY
Qty: 180 | Refills: 0 | Status: ACTIVE | COMMUNITY
Start: 2024-03-26 | End: 1900-01-01

## 2024-03-26 RX ORDER — LOSARTAN POTASSIUM 50 MG/1
50 TABLET, FILM COATED ORAL
Qty: 90 | Refills: 3 | Status: ACTIVE | COMMUNITY
Start: 2024-03-26 | End: 1900-01-01

## 2024-03-26 RX ORDER — TICAGRELOR 60 MG/1
TABLET ORAL
Refills: 0 | Status: ACTIVE | COMMUNITY

## 2024-03-27 LAB
APPEARANCE: ABNORMAL
BACTERIA: NEGATIVE /HPF
BASOPHILS # BLD AUTO: 0.02 K/UL
BASOPHILS NFR BLD AUTO: 0.5 %
BILIRUBIN URINE: ABNORMAL
BLOOD URINE: NEGATIVE
CALCIUM OXALATE CRYSTALS: PRESENT
CAST: 11 /LPF
COLOR: NORMAL
CREAT SPEC-SCNC: 440 MG/DL
CREAT SPEC-SCNC: 440 MG/DL
CREAT/PROT UR: 0.2 RATIO
EOSINOPHIL # BLD AUTO: 0.13 K/UL
EOSINOPHIL NFR BLD AUTO: 3.2 %
EPITHELIAL CELLS: 1 /HPF
ESTIMATED AVERAGE GLUCOSE: 166 MG/DL
GLUCOSE QUALITATIVE U: 250 MG/DL
HBA1C MFR BLD HPLC: 7.4 %
HBV CORE IGG+IGM SER QL: NONREACTIVE
HBV SURFACE AB SERPL IA-ACNC: <3 MIU/ML
HBV SURFACE AG SER QL: NONREACTIVE
HCT VFR BLD CALC: 45.7 %
HEPATITIS A IGG ANTIBODY: NONREACTIVE
HGB BLD-MCNC: 14.6 G/DL
HYALINE CASTS: PRESENT
IMM GRANULOCYTES NFR BLD AUTO: 0.2 %
KETONES URINE: ABNORMAL MG/DL
LEUKOCYTE ESTERASE URINE: NEGATIVE
LYMPHOCYTES # BLD AUTO: 1.21 K/UL
LYMPHOCYTES NFR BLD AUTO: 29.6 %
MAN DIFF?: NORMAL
MCHC RBC-ENTMCNC: 27.2 PG
MCHC RBC-ENTMCNC: 31.9 GM/DL
MCV RBC AUTO: 85.1 FL
MICROALBUMIN 24H UR DL<=1MG/L-MCNC: 4.9 MG/DL
MICROALBUMIN/CREAT 24H UR-RTO: 11 MG/G
MICROSCOPIC-UA: NORMAL
MONOCYTES # BLD AUTO: 0.56 K/UL
MONOCYTES NFR BLD AUTO: 13.7 %
MUCUS: PRESENT
NEUTROPHILS # BLD AUTO: 2.16 K/UL
NEUTROPHILS NFR BLD AUTO: 52.8 %
NITRITE URINE: NEGATIVE
PH URINE: 5.5
PLATELET # BLD AUTO: 145 K/UL
PROT UR-MCNC: 91 MG/DL
PROTEIN URINE: 100 MG/DL
RBC # BLD: 5.37 M/UL
RBC # FLD: 16.3 %
RED BLOOD CELLS URINE: 2 /HPF
REVIEW: NORMAL
SPECIFIC GRAVITY URINE: >1.03
UROBILINOGEN URINE: 1 MG/DL
WBC # FLD AUTO: 4.09 K/UL
WHITE BLOOD CELLS URINE: 0 /HPF

## 2024-03-27 NOTE — ASSESSMENT
[FreeTextEntry1] : 59M c DM, HTN, CAD s/p stent in ?2021, HLD, adrenal nodule (2023), fatty liver (20230, h/o of hernia repair  here for CPE and cc of acute bronchitis and adrenal nodule  [facility] - check BW in office - check Hepatitis A and B titers -if non immune to hep A + B advised Hep A + B vaccination physical ecg performed - ECG NSR due for FBSE with dermatology due for ophthalmology and dental exam  states utd wt colonoscopy 7/23 - as per pt due for repeat in 3 year (7/2026) due to polyps  advised PPSV23 - defer until recovers from acute bronchitis defer covid vaccine  found to have small R. adrenal nodule - advised endo f/u in 6-12 months will establish care with new cardiologist (insurance changed)  d/w pt risks and benefits of GLP-1 agonist - could consider in future  rtc in 7-10 days for acute bronchitis rtc in 3-4 months for dm, htn, hld   pt to sign release for previous medical records from PCP and cardiologist

## 2024-03-27 NOTE — PHYSICAL EXAM
[No Acute Distress] : no acute distress [Well Nourished] : well nourished [Normal Oropharynx] : the oropharynx was normal [Normal Affect] : the affect was normal [Normal Insight/Judgement] : insight and judgment were intact [No Lymphadenopathy] : no lymphadenopathy [Supple] : supple [No Accessory Muscle Use] : no accessory muscle use [Thyroid Normal, No Nodules] : the thyroid was normal and there were no nodules present [Regular Rhythm] : with a regular rhythm [Normal S1, S2] : normal S1 and S2 [Clear to Auscultation] : lungs were clear to auscultation bilaterally [No Murmur] : no murmur heard [Soft] : abdomen soft [No Edema] : there was no peripheral edema [Non Tender] : non-tender [No Masses] : no abdominal mass palpated [Non-distended] : non-distended [Normal Bowel Sounds] : normal bowel sounds [No HSM] : no HSM [Normal Posterior Cervical Nodes] : no posterior cervical lymphadenopathy [Normal Supraclavicular Nodes] : no supraclavicular lymphadenopathy [Normal Anterior Cervical Nodes] : no anterior cervical lymphadenopathy [de-identified] : +PND; moderate erythema of b/l nares [de-identified] : mild wheezing [de-identified] : deferred to   deferred to

## 2024-03-27 NOTE — HEALTH RISK ASSESSMENT
[Patient reported colonoscopy was normal] : Patient reported colonoscopy was normal [HIV test declined] : HIV test declined [ColonoscopyDate] : 1/23

## 2024-03-27 NOTE — HISTORY OF PRESENT ILLNESS
[FreeTextEntry1] :  CPE  [de-identified] : Diet: pretty good Exercise: walking 150 minutes/week   insurance changes - had previously seen cardiologist Dr. Mike Jennings - as per pt will need to establish w/new cardiologist due to insurance changes  CAD/HLD/HTN - needs renewal of all medications including Repatha    DM - on meformin   had recently seen his surgeon to evaluate for hernia repair - had CTAP scan done that showed incidental finding of subcm adrenal nodule - also noted hepatic steatosis (fatty liver) - pt states 1.8cm hypodense L. renal lesion was a simple cyst on subsequent US surgeon sent him for   URI - Has had ~1 week of cough a/w wheezing - no sob or hanley  family also has URI - no f/chills  no discolored rhinorrhea

## 2024-03-28 ENCOUNTER — NON-APPOINTMENT (OUTPATIENT)
Age: 59
End: 2024-03-28

## 2024-04-06 ENCOUNTER — APPOINTMENT (OUTPATIENT)
Dept: RADIOLOGY | Facility: CLINIC | Age: 59
End: 2024-04-06
Payer: COMMERCIAL

## 2024-04-06 PROCEDURE — 71046 X-RAY EXAM CHEST 2 VIEWS: CPT

## 2024-04-15 ENCOUNTER — TRANSCRIPTION ENCOUNTER (OUTPATIENT)
Age: 59
End: 2024-04-15

## 2024-04-22 ENCOUNTER — APPOINTMENT (OUTPATIENT)
Dept: INTERNAL MEDICINE | Facility: CLINIC | Age: 59
End: 2024-04-22
Payer: COMMERCIAL

## 2024-04-22 VITALS
SYSTOLIC BLOOD PRESSURE: 120 MMHG | HEIGHT: 72 IN | WEIGHT: 247 LBS | DIASTOLIC BLOOD PRESSURE: 80 MMHG | BODY MASS INDEX: 33.46 KG/M2 | OXYGEN SATURATION: 98 %

## 2024-04-22 DIAGNOSIS — J20.9 ACUTE BRONCHITIS, UNSPECIFIED: ICD-10-CM

## 2024-04-22 PROCEDURE — G2211 COMPLEX E/M VISIT ADD ON: CPT

## 2024-04-22 PROCEDURE — 99214 OFFICE O/P EST MOD 30 MIN: CPT

## 2024-04-22 RX ORDER — METFORMIN HYDROCHLORIDE 1000 MG/1
1000 TABLET, COATED ORAL
Qty: 180 | Refills: 2 | Status: ACTIVE | COMMUNITY
Start: 2024-03-26

## 2024-04-22 RX ORDER — TIRZEPATIDE 2.5 MG/.5ML
2.5 INJECTION, SOLUTION SUBCUTANEOUS
Qty: 1 | Refills: 1 | Status: DISCONTINUED | COMMUNITY
Start: 2024-03-26 | End: 2024-04-22

## 2024-04-22 RX ORDER — SEMAGLUTIDE 0.68 MG/ML
2 INJECTION, SOLUTION SUBCUTANEOUS
Qty: 1 | Refills: 1 | Status: DISCONTINUED | COMMUNITY
Start: 2024-03-26 | End: 2024-04-22

## 2024-04-22 NOTE — PHYSICAL EXAM
[No Acute Distress] : no acute distress [Well Nourished] : well nourished [Clear to Auscultation] : lungs were clear to auscultation bilaterally [Regular Rhythm] : with a regular rhythm [Normal S1, S2] : normal S1 and S2 [No Murmur] : no murmur heard [No Edema] : there was no peripheral edema [Soft] : abdomen soft [Non Tender] : non-tender [Non-distended] : non-distended [No Masses] : no abdominal mass palpated [No HSM] : no HSM [Normal Bowel Sounds] : normal bowel sounds [Normal Affect] : the affect was normal [Normal Insight/Judgement] : insight and judgment were intact

## 2024-04-22 NOTE — REVIEW OF SYSTEMS
[Abdominal Pain] : no abdominal pain [Nausea] : no nausea [Diarrhea] : no diarrhea [Vomiting] : no vomiting [Heartburn] : heartburn [Melena] : no melena [Negative] : Cardiovascular

## 2024-04-22 NOTE — HISTORY OF PRESENT ILLNESS
[FreeTextEntry1] :  F/U  [de-identified] :  GI - has been constipated since was on higher dose ozempic 0.5mg weekly - went to see GI Dr. Carmona - was told to miralax prn  unable to tolerate higher dose ozempic 0.5mg weekly but did well with ozempic 0.25mg weelky  states is on metformin 1000mg BID  has not been checking FS  had BM today  has been eating lean chicken and vegetables - on diet - avoided soda

## 2024-04-23 ENCOUNTER — TRANSCRIPTION ENCOUNTER (OUTPATIENT)
Age: 59
End: 2024-04-23

## 2024-04-23 LAB
25(OH)D3 SERPL-MCNC: 20.4 NG/ML
ALBUMIN SERPL ELPH-MCNC: 4.1 G/DL
ALP BLD-CCNC: 76 U/L
ALT SERPL-CCNC: 67 U/L
ANION GAP SERPL CALC-SCNC: 16 MMOL/L
APPEARANCE: ABNORMAL
AST SERPL-CCNC: 47 U/L
BACTERIA UR CULT: NORMAL
BACTERIA: NEGATIVE /HPF
BILIRUB SERPL-MCNC: 0.3 MG/DL
BILIRUBIN URINE: NEGATIVE
BLOOD URINE: NEGATIVE
BUN SERPL-MCNC: 14 MG/DL
CALCIUM OXALATE CRYSTALS: PRESENT
CALCIUM SERPL-MCNC: 9 MG/DL
CAST: 0 /LPF
CHLORIDE SERPL-SCNC: 101 MMOL/L
CHOLEST SERPL-MCNC: 143 MG/DL
CO2 SERPL-SCNC: 22 MMOL/L
COLOR: YELLOW
CREAT SERPL-MCNC: 0.79 MG/DL
CREAT SPEC-SCNC: 199 MG/DL
CREAT SPEC-SCNC: 199 MG/DL
CREAT/PROT UR: 0.1 RATIO
EGFR: 103 ML/MIN/1.73M2
EPITHELIAL CELLS: 0 /HPF
GLUCOSE QUALITATIVE U: NEGATIVE MG/DL
GLUCOSE SERPL-MCNC: 188 MG/DL
HDLC SERPL-MCNC: 26 MG/DL
KETONES URINE: NEGATIVE MG/DL
LDLC SERPL CALC-MCNC: 84 MG/DL
LEUKOCYTE ESTERASE URINE: NEGATIVE
MICROALBUMIN 24H UR DL<=1MG/L-MCNC: <1.2 MG/DL
MICROALBUMIN/CREAT 24H UR-RTO: NORMAL MG/G
MICROSCOPIC-UA: NORMAL
NITRITE URINE: NEGATIVE
NONHDLC SERPL-MCNC: 117 MG/DL
PH URINE: 5.5
POTASSIUM SERPL-SCNC: 4 MMOL/L
PROT SERPL-MCNC: 7.1 G/DL
PROT UR-MCNC: 12 MG/DL
PROTEIN URINE: NEGATIVE MG/DL
PSA SERPL-MCNC: 0.72 NG/ML
RED BLOOD CELLS URINE: 0 /HPF
REVIEW: NORMAL
SODIUM SERPL-SCNC: 139 MMOL/L
SPECIFIC GRAVITY URINE: 1.02
T4 FREE SERPL-MCNC: 1.2 NG/DL
TRIGL SERPL-MCNC: 195 MG/DL
TSH SERPL-ACNC: 1.29 UIU/ML
UROBILINOGEN URINE: 0.2 MG/DL
VIT B12 SERPL-MCNC: 472 PG/ML
WHITE BLOOD CELLS URINE: 0 /HPF

## 2024-04-24 LAB — BACTERIA UR CULT: NORMAL

## 2024-06-03 ENCOUNTER — APPOINTMENT (OUTPATIENT)
Dept: INTERNAL MEDICINE | Facility: CLINIC | Age: 59
End: 2024-06-03

## 2024-06-03 ENCOUNTER — NON-APPOINTMENT (OUTPATIENT)
Age: 59
End: 2024-06-03

## 2024-06-22 ENCOUNTER — RX RENEWAL (OUTPATIENT)
Age: 59
End: 2024-06-22

## 2024-06-22 RX ORDER — OMEGA-3-ACID ETHYL ESTERS CAPSULES 1 G/1
1 CAPSULE, LIQUID FILLED ORAL
Qty: 180 | Refills: 2 | Status: ACTIVE | COMMUNITY
Start: 2024-03-26 | End: 1900-01-01

## 2024-07-01 ENCOUNTER — APPOINTMENT (OUTPATIENT)
Dept: INTERNAL MEDICINE | Facility: CLINIC | Age: 59
End: 2024-07-01
Payer: COMMERCIAL

## 2024-07-01 VITALS
SYSTOLIC BLOOD PRESSURE: 110 MMHG | BODY MASS INDEX: 33.81 KG/M2 | DIASTOLIC BLOOD PRESSURE: 70 MMHG | TEMPERATURE: 98 F | WEIGHT: 249.31 LBS

## 2024-07-01 DIAGNOSIS — E11.9 TYPE 2 DIABETES MELLITUS W/OUT COMPLICATIONS: ICD-10-CM

## 2024-07-01 DIAGNOSIS — K76.0 FATTY (CHANGE OF) LIVER, NOT ELSEWHERE CLASSIFIED: ICD-10-CM

## 2024-07-01 DIAGNOSIS — R61 GENERALIZED HYPERHIDROSIS: ICD-10-CM

## 2024-07-01 DIAGNOSIS — E27.8 OTHER SPECIFIED DISORDERS OF ADRENAL GLAND: ICD-10-CM

## 2024-07-01 DIAGNOSIS — R19.5 OTHER FECAL ABNORMALITIES: ICD-10-CM

## 2024-07-01 DIAGNOSIS — I10 ESSENTIAL (PRIMARY) HYPERTENSION: ICD-10-CM

## 2024-07-01 LAB
APPEARANCE: CLEAR
BACTERIA: NEGATIVE /HPF
BASOPHILS # BLD AUTO: 0.03 K/UL
BASOPHILS NFR BLD AUTO: 0.5 %
BILIRUBIN URINE: NEGATIVE
BLOOD URINE: NEGATIVE
CAST: 1 /LPF
COLOR: YELLOW
CREAT SPEC-SCNC: 157 MG/DL
EOSINOPHIL # BLD AUTO: 0.15 K/UL
EOSINOPHIL NFR BLD AUTO: 2.7 %
EPITHELIAL CELLS: 0 /HPF
ERYTHROCYTE [SEDIMENTATION RATE] IN BLOOD BY WESTERGREN METHOD: 18 MM/HR
ESTIMATED AVERAGE GLUCOSE: 140 MG/DL
GLUCOSE QUALITATIVE U: NEGATIVE MG/DL
HBA1C MFR BLD HPLC: 6.5 %
HCT VFR BLD CALC: 43.7 %
HGB BLD-MCNC: 14 G/DL
IMM GRANULOCYTES NFR BLD AUTO: 0.4 %
KETONES URINE: NEGATIVE MG/DL
LEUKOCYTE ESTERASE URINE: NEGATIVE
LYMPHOCYTES # BLD AUTO: 1.42 K/UL
LYMPHOCYTES NFR BLD AUTO: 25.5 %
MAN DIFF?: NORMAL
MCHC RBC-ENTMCNC: 27.6 PG
MCHC RBC-ENTMCNC: 32 GM/DL
MCV RBC AUTO: 86 FL
MICROALBUMIN 24H UR DL<=1MG/L-MCNC: <1.2 MG/DL
MICROALBUMIN/CREAT 24H UR-RTO: NORMAL MG/G
MICROSCOPIC-UA: NORMAL
MONOCYTES # BLD AUTO: 0.37 K/UL
MONOCYTES NFR BLD AUTO: 6.7 %
NEUTROPHILS # BLD AUTO: 3.57 K/UL
NEUTROPHILS NFR BLD AUTO: 64.2 %
NITRITE URINE: NEGATIVE
PH URINE: 5.5
PLATELET # BLD AUTO: 164 K/UL
PROTEIN URINE: NEGATIVE MG/DL
RBC # BLD: 5.08 M/UL
RBC # FLD: 15 %
RED BLOOD CELLS URINE: 1 /HPF
SPECIFIC GRAVITY URINE: 1.02
UROBILINOGEN URINE: 0.2 MG/DL
WBC # FLD AUTO: 5.56 K/UL
WHITE BLOOD CELLS URINE: 1 /HPF

## 2024-07-01 PROCEDURE — 99214 OFFICE O/P EST MOD 30 MIN: CPT

## 2024-07-01 PROCEDURE — 36415 COLL VENOUS BLD VENIPUNCTURE: CPT

## 2024-07-01 PROCEDURE — G2211 COMPLEX E/M VISIT ADD ON: CPT | Mod: NC

## 2024-07-02 LAB
ALBUMIN SERPL ELPH-MCNC: 4.3 G/DL
ALP BLD-CCNC: 76 U/L
ALT SERPL-CCNC: 25 U/L
ANION GAP SERPL CALC-SCNC: 13 MMOL/L
AST SERPL-CCNC: 15 U/L
BILIRUB SERPL-MCNC: 0.2 MG/DL
BUN SERPL-MCNC: 10 MG/DL
CALCIUM SERPL-MCNC: 9.5 MG/DL
CHLORIDE SERPL-SCNC: 103 MMOL/L
CHOLEST SERPL-MCNC: 177 MG/DL
CO2 SERPL-SCNC: 24 MMOL/L
CREAT SERPL-MCNC: 0.7 MG/DL
CRP SERPL-MCNC: 8 MG/L
EGFR: 107 ML/MIN/1.73M2
GLUCOSE SERPL-MCNC: 143 MG/DL
HDLC SERPL-MCNC: 36 MG/DL
LDLC SERPL CALC-MCNC: 110 MG/DL
NONHDLC SERPL-MCNC: 141 MG/DL
POTASSIUM SERPL-SCNC: 4.4 MMOL/L
PROT SERPL-MCNC: 7 G/DL
SODIUM SERPL-SCNC: 140 MMOL/L
T4 FREE SERPL-MCNC: 1.2 NG/DL
TRIGL SERPL-MCNC: 176 MG/DL
TSH SERPL-ACNC: 0.94 UIU/ML

## 2024-07-03 LAB — BACTERIA UR CULT: NORMAL

## 2024-08-05 ENCOUNTER — APPOINTMENT (OUTPATIENT)
Dept: GASTROENTEROLOGY | Facility: CLINIC | Age: 59
End: 2024-08-05

## 2024-09-20 ENCOUNTER — APPOINTMENT (OUTPATIENT)
Dept: INTERNAL MEDICINE | Facility: CLINIC | Age: 59
End: 2024-09-20

## 2024-09-23 NOTE — HISTORY OF PRESENT ILLNESS
[de-identified] : NISHA  is a 59 year  male with a history of 2 prior ventral hernia repair here to have further discussion regarding surgical repair for existing ventral hernia.

## 2024-09-23 NOTE — HISTORY OF PRESENT ILLNESS
[de-identified] : NISHA  is a 59 year  male with a history of 2 prior ventral hernia repair here to have further discussion regarding surgical repair for existing ventral hernia.

## 2024-09-24 ENCOUNTER — APPOINTMENT (OUTPATIENT)
Dept: SURGERY | Facility: CLINIC | Age: 59
End: 2024-09-24

## 2024-10-07 ENCOUNTER — APPOINTMENT (OUTPATIENT)
Dept: GASTROENTEROLOGY | Facility: CLINIC | Age: 59
End: 2024-10-07

## 2024-10-15 ENCOUNTER — NON-APPOINTMENT (OUTPATIENT)
Age: 59
End: 2024-10-15

## 2024-10-15 ENCOUNTER — APPOINTMENT (OUTPATIENT)
Dept: CARDIOLOGY | Facility: CLINIC | Age: 59
End: 2024-10-15
Payer: COMMERCIAL

## 2024-10-15 VITALS
HEART RATE: 82 BPM | SYSTOLIC BLOOD PRESSURE: 128 MMHG | OXYGEN SATURATION: 95 % | HEIGHT: 72 IN | BODY MASS INDEX: 33.72 KG/M2 | WEIGHT: 249 LBS | DIASTOLIC BLOOD PRESSURE: 81 MMHG

## 2024-10-15 DIAGNOSIS — E78.5 HYPERLIPIDEMIA, UNSPECIFIED: ICD-10-CM

## 2024-10-15 PROCEDURE — 99205 OFFICE O/P NEW HI 60 MIN: CPT

## 2024-10-15 PROCEDURE — G2211 COMPLEX E/M VISIT ADD ON: CPT

## 2024-10-15 PROCEDURE — 93000 ELECTROCARDIOGRAM COMPLETE: CPT

## 2024-10-15 RX ORDER — ASPIRIN 81 MG/1
81 TABLET, COATED ORAL
Refills: 0 | Status: ACTIVE | COMMUNITY
Start: 2024-10-15

## 2024-10-15 RX ORDER — ROSUVASTATIN CALCIUM 5 MG/1
5 TABLET, FILM COATED ORAL DAILY
Qty: 45 | Refills: 3 | Status: ACTIVE | COMMUNITY
Start: 2024-10-15 | End: 1900-01-01

## 2024-10-22 ENCOUNTER — MED ADMIN CHARGE (OUTPATIENT)
Age: 59
End: 2024-10-22

## 2024-10-22 ENCOUNTER — APPOINTMENT (OUTPATIENT)
Dept: CARDIOLOGY | Facility: CLINIC | Age: 59
End: 2024-10-22
Payer: COMMERCIAL

## 2024-10-22 PROCEDURE — 93306 TTE W/DOPPLER COMPLETE: CPT

## 2024-10-22 RX ADMIN — PERFLUTREN MG/ML: 6.52 INJECTION, SUSPENSION INTRAVENOUS at 00:00

## 2024-10-23 RX ORDER — PERFLUTREN 6.52 MG/ML
6.52 INJECTION, SUSPENSION INTRAVENOUS
Qty: 2 | Refills: 0 | Status: COMPLETED | OUTPATIENT
Start: 2024-10-22

## 2024-11-12 ENCOUNTER — APPOINTMENT (OUTPATIENT)
Dept: ENDOCRINOLOGY | Facility: CLINIC | Age: 59
End: 2024-11-12
Payer: COMMERCIAL

## 2024-11-12 ENCOUNTER — RESULT CHARGE (OUTPATIENT)
Age: 59
End: 2024-11-12

## 2024-11-12 DIAGNOSIS — N28.9 DISORDER OF KIDNEY AND URETER, UNSPECIFIED: ICD-10-CM

## 2024-11-12 DIAGNOSIS — E11.9 TYPE 2 DIABETES MELLITUS W/OUT COMPLICATIONS: ICD-10-CM

## 2024-11-12 DIAGNOSIS — E78.5 HYPERLIPIDEMIA, UNSPECIFIED: ICD-10-CM

## 2024-11-12 DIAGNOSIS — E27.9 DISORDER OF ADRENAL GLAND, UNSPECIFIED: ICD-10-CM

## 2024-11-12 PROCEDURE — 99205 OFFICE O/P NEW HI 60 MIN: CPT

## 2024-11-21 RX ORDER — EMPAGLIFLOZIN 10 MG/1
10 TABLET, FILM COATED ORAL
Qty: 90 | Refills: 2 | Status: ACTIVE | COMMUNITY
Start: 2024-11-12

## 2024-11-22 LAB
ALDOSTERONE SERUM: 29.7 NG/DL
CORTIS SERPL-MCNC: 5.3 UG/DL
DOPAMINE UR-MCNC: <30 PG/ML
EPINEPH UR-MCNC: <15 PG/ML
METANEPHRINE, PL: 27.9 PG/ML
NOREPINEPH UR-MCNC: 344 PG/ML
NORMETANEPHRINE, PL: 171.9 PG/ML
RENIN ACTIVITY, PLASMA: 4.11 NG/ML/HR

## 2024-11-26 ENCOUNTER — APPOINTMENT (OUTPATIENT)
Dept: ENDOCRINOLOGY | Facility: CLINIC | Age: 59
End: 2024-11-26

## 2025-01-22 ENCOUNTER — NON-APPOINTMENT (OUTPATIENT)
Age: 60
End: 2025-01-22

## 2025-01-22 ENCOUNTER — APPOINTMENT (OUTPATIENT)
Dept: CARDIOLOGY | Facility: CLINIC | Age: 60
End: 2025-01-22
Payer: COMMERCIAL

## 2025-01-22 VITALS — DIASTOLIC BLOOD PRESSURE: 70 MMHG | SYSTOLIC BLOOD PRESSURE: 120 MMHG

## 2025-01-22 VITALS
SYSTOLIC BLOOD PRESSURE: 143 MMHG | DIASTOLIC BLOOD PRESSURE: 83 MMHG | BODY MASS INDEX: 34.13 KG/M2 | WEIGHT: 252 LBS | HEART RATE: 71 BPM | HEIGHT: 72 IN | OXYGEN SATURATION: 96 %

## 2025-01-22 DIAGNOSIS — Z86.79 PERSONAL HISTORY OF OTHER DISEASES OF THE CIRCULATORY SYSTEM: ICD-10-CM

## 2025-01-22 DIAGNOSIS — I10 ESSENTIAL (PRIMARY) HYPERTENSION: ICD-10-CM

## 2025-01-22 PROCEDURE — 93000 ELECTROCARDIOGRAM COMPLETE: CPT

## 2025-01-22 PROCEDURE — 99214 OFFICE O/P EST MOD 30 MIN: CPT

## 2025-01-22 PROCEDURE — G2211 COMPLEX E/M VISIT ADD ON: CPT

## 2025-01-23 ENCOUNTER — NON-APPOINTMENT (OUTPATIENT)
Age: 60
End: 2025-01-23

## 2025-02-24 ENCOUNTER — APPOINTMENT (OUTPATIENT)
Dept: CT IMAGING | Facility: CLINIC | Age: 60
End: 2025-02-24
Payer: COMMERCIAL

## 2025-02-24 PROCEDURE — 70486 CT MAXILLOFACIAL W/O DYE: CPT

## 2025-03-05 ENCOUNTER — APPOINTMENT (OUTPATIENT)
Dept: OTOLARYNGOLOGY | Facility: CLINIC | Age: 60
End: 2025-03-05
Payer: COMMERCIAL

## 2025-03-05 VITALS
SYSTOLIC BLOOD PRESSURE: 116 MMHG | HEART RATE: 88 BPM | BODY MASS INDEX: 33.59 KG/M2 | DIASTOLIC BLOOD PRESSURE: 80 MMHG | WEIGHT: 248 LBS | HEIGHT: 72 IN

## 2025-03-05 DIAGNOSIS — J34.2 DEVIATED NASAL SEPTUM: ICD-10-CM

## 2025-03-05 PROCEDURE — 31231 NASAL ENDOSCOPY DX: CPT

## 2025-03-05 PROCEDURE — 99204 OFFICE O/P NEW MOD 45 MIN: CPT | Mod: 25

## 2025-03-05 RX ORDER — DOXYCYCLINE HYCLATE 100 MG/1
100 CAPSULE ORAL
Qty: 14 | Refills: 0 | Status: ACTIVE | COMMUNITY
Start: 2025-03-05 | End: 1900-01-01

## 2025-03-07 ENCOUNTER — APPOINTMENT (OUTPATIENT)
Dept: INTERNAL MEDICINE | Facility: CLINIC | Age: 60
End: 2025-03-07
Payer: COMMERCIAL

## 2025-03-07 VITALS
SYSTOLIC BLOOD PRESSURE: 120 MMHG | DIASTOLIC BLOOD PRESSURE: 80 MMHG | BODY MASS INDEX: 33.05 KG/M2 | WEIGHT: 244 LBS | HEIGHT: 72 IN

## 2025-03-07 DIAGNOSIS — E78.5 HYPERLIPIDEMIA, UNSPECIFIED: ICD-10-CM

## 2025-03-07 DIAGNOSIS — E27.9 DISORDER OF ADRENAL GLAND, UNSPECIFIED: ICD-10-CM

## 2025-03-07 DIAGNOSIS — J32.9 CHRONIC SINUSITIS, UNSPECIFIED: ICD-10-CM

## 2025-03-07 DIAGNOSIS — E11.9 TYPE 2 DIABETES MELLITUS W/OUT COMPLICATIONS: ICD-10-CM

## 2025-03-07 DIAGNOSIS — I10 ESSENTIAL (PRIMARY) HYPERTENSION: ICD-10-CM

## 2025-03-07 PROCEDURE — 36415 COLL VENOUS BLD VENIPUNCTURE: CPT

## 2025-03-07 PROCEDURE — 99214 OFFICE O/P EST MOD 30 MIN: CPT

## 2025-03-07 PROCEDURE — G2211 COMPLEX E/M VISIT ADD ON: CPT

## 2025-03-07 PROCEDURE — 81003 URINALYSIS AUTO W/O SCOPE: CPT | Mod: QW

## 2025-03-12 ENCOUNTER — NON-APPOINTMENT (OUTPATIENT)
Age: 60
End: 2025-03-12

## 2025-03-12 ENCOUNTER — TRANSCRIPTION ENCOUNTER (OUTPATIENT)
Age: 60
End: 2025-03-12

## 2025-03-12 LAB
ALBUMIN SERPL ELPH-MCNC: 4.2 G/DL
ALP BLD-CCNC: 81 U/L
ALT SERPL-CCNC: 33 U/L
ANION GAP SERPL CALC-SCNC: 16 MMOL/L
APPEARANCE: CLEAR
AST SERPL-CCNC: 18 U/L
BASOPHILS # BLD AUTO: 0.03 K/UL
BASOPHILS NFR BLD AUTO: 0.5 %
BILIRUB SERPL-MCNC: 0.3 MG/DL
BILIRUBIN URINE: NEGATIVE
BLOOD URINE: NEGATIVE
BUN SERPL-MCNC: 11 MG/DL
CALCIUM SERPL-MCNC: 9.3 MG/DL
CHLORIDE SERPL-SCNC: 108 MMOL/L
CHOLEST SERPL-MCNC: 142 MG/DL
CO2 SERPL-SCNC: 18 MMOL/L
COLOR: YELLOW
CREAT SERPL-MCNC: 0.57 MG/DL
CREAT SPEC-SCNC: 58 MG/DL
EGFRCR SERPLBLD CKD-EPI 2021: 113 ML/MIN/1.73M2
EOSINOPHIL # BLD AUTO: 0.09 K/UL
EOSINOPHIL NFR BLD AUTO: 1.6 %
ESTIMATED AVERAGE GLUCOSE: 200 MG/DL
GLUCOSE QUALITATIVE U: >=1000 MG/DL
GLUCOSE SERPL-MCNC: 223 MG/DL
HBA1C MFR BLD HPLC: 8.6 %
HCT VFR BLD CALC: 44.3 %
HDLC SERPL-MCNC: 32 MG/DL
HGB BLD-MCNC: 14.3 G/DL
IMM GRANULOCYTES NFR BLD AUTO: 0.3 %
KETONES URINE: NEGATIVE MG/DL
LDLC SERPL CALC-MCNC: 62 MG/DL
LEUKOCYTE ESTERASE URINE: NEGATIVE
LYMPHOCYTES # BLD AUTO: 1.27 K/UL
LYMPHOCYTES NFR BLD AUTO: 22 %
MAN DIFF?: NORMAL
MCHC RBC-ENTMCNC: 27.5 PG
MCHC RBC-ENTMCNC: 32.3 G/DL
MCV RBC AUTO: 85.2 FL
MICROALBUMIN 24H UR DL<=1MG/L-MCNC: <1.2 MG/DL
MICROALBUMIN/CREAT 24H UR-RTO: NORMAL MG/G
MONOCYTES # BLD AUTO: 0.41 K/UL
MONOCYTES NFR BLD AUTO: 7.1 %
NEUTROPHILS # BLD AUTO: 3.96 K/UL
NEUTROPHILS NFR BLD AUTO: 68.5 %
NITRITE URINE: NEGATIVE
NONHDLC SERPL-MCNC: 109 MG/DL
PH URINE: 5.5
PLATELET # BLD AUTO: 199 K/UL
POTASSIUM SERPL-SCNC: 4.2 MMOL/L
PROT SERPL-MCNC: 6.6 G/DL
PROTEIN URINE: NEGATIVE MG/DL
PSA SERPL-MCNC: 0.82 NG/ML
RBC # BLD: 5.2 M/UL
RBC # FLD: 15.5 %
SODIUM SERPL-SCNC: 143 MMOL/L
SPECIFIC GRAVITY URINE: >1.03
T4 FREE SERPL-MCNC: 1.1 NG/DL
TRIGL SERPL-MCNC: 300 MG/DL
TSH SERPL-ACNC: 0.74 UIU/ML
UROBILINOGEN URINE: 0.2 MG/DL
WBC # FLD AUTO: 5.78 K/UL

## 2025-03-26 ENCOUNTER — APPOINTMENT (OUTPATIENT)
Dept: OTOLARYNGOLOGY | Facility: CLINIC | Age: 60
End: 2025-03-26
Payer: COMMERCIAL

## 2025-03-26 ENCOUNTER — RESULT REVIEW (OUTPATIENT)
Age: 60
End: 2025-03-26

## 2025-03-26 VITALS
BODY MASS INDEX: 33.23 KG/M2 | HEART RATE: 87 BPM | DIASTOLIC BLOOD PRESSURE: 73 MMHG | WEIGHT: 245.38 LBS | HEIGHT: 72 IN | SYSTOLIC BLOOD PRESSURE: 114 MMHG

## 2025-03-26 DIAGNOSIS — J32.9 CHRONIC SINUSITIS, UNSPECIFIED: ICD-10-CM

## 2025-03-26 DIAGNOSIS — J34.2 DEVIATED NASAL SEPTUM: ICD-10-CM

## 2025-03-26 PROCEDURE — 99213 OFFICE O/P EST LOW 20 MIN: CPT

## 2025-03-26 PROCEDURE — 70486 CT MAXILLOFACIAL W/O DYE: CPT

## 2025-03-28 ENCOUNTER — APPOINTMENT (OUTPATIENT)
Dept: MRI IMAGING | Facility: CLINIC | Age: 60
End: 2025-03-28

## 2025-03-28 PROCEDURE — 74183 MRI ABD W/O CNTR FLWD CNTR: CPT

## 2025-03-28 PROCEDURE — 72197 MRI PELVIS W/O & W/DYE: CPT

## 2025-03-28 PROCEDURE — A9585: CPT

## 2025-04-07 ENCOUNTER — APPOINTMENT (OUTPATIENT)
Dept: ENDOCRINOLOGY | Facility: CLINIC | Age: 60
End: 2025-04-07
Payer: COMMERCIAL

## 2025-04-07 DIAGNOSIS — E78.5 HYPERLIPIDEMIA, UNSPECIFIED: ICD-10-CM

## 2025-04-07 DIAGNOSIS — E11.9 TYPE 2 DIABETES MELLITUS W/OUT COMPLICATIONS: ICD-10-CM

## 2025-04-07 DIAGNOSIS — E27.9 DISORDER OF ADRENAL GLAND, UNSPECIFIED: ICD-10-CM

## 2025-04-07 PROCEDURE — 99214 OFFICE O/P EST MOD 30 MIN: CPT | Mod: 95

## 2025-04-14 ENCOUNTER — APPOINTMENT (OUTPATIENT)
Dept: OTOLARYNGOLOGY | Facility: CLINIC | Age: 60
End: 2025-04-14
Payer: COMMERCIAL

## 2025-04-14 VITALS
HEIGHT: 72 IN | WEIGHT: 245 LBS | HEART RATE: 81 BPM | BODY MASS INDEX: 33.18 KG/M2 | DIASTOLIC BLOOD PRESSURE: 85 MMHG | TEMPERATURE: 98.3 F | SYSTOLIC BLOOD PRESSURE: 139 MMHG

## 2025-04-14 DIAGNOSIS — J34.2 DEVIATED NASAL SEPTUM: ICD-10-CM

## 2025-04-14 DIAGNOSIS — J32.9 CHRONIC SINUSITIS, UNSPECIFIED: ICD-10-CM

## 2025-04-14 PROCEDURE — 31231 NASAL ENDOSCOPY DX: CPT

## 2025-04-14 PROCEDURE — 99213 OFFICE O/P EST LOW 20 MIN: CPT | Mod: 25

## 2025-05-05 ENCOUNTER — RX RENEWAL (OUTPATIENT)
Age: 60
End: 2025-05-05

## 2025-06-26 ENCOUNTER — APPOINTMENT (OUTPATIENT)
Dept: OTOLARYNGOLOGY | Facility: CLINIC | Age: 60
End: 2025-06-26

## 2025-07-07 ENCOUNTER — APPOINTMENT (OUTPATIENT)
Dept: INTERNAL MEDICINE | Facility: CLINIC | Age: 60
End: 2025-07-07

## 2025-07-15 ENCOUNTER — RX RENEWAL (OUTPATIENT)
Age: 60
End: 2025-07-15

## 2025-07-15 RX ORDER — ROSUVASTATIN CALCIUM 5 MG/1
5 TABLET, FILM COATED ORAL
Qty: 90 | Refills: 1 | Status: ACTIVE | COMMUNITY
Start: 2025-07-15

## 2025-08-06 ENCOUNTER — APPOINTMENT (OUTPATIENT)
Dept: ENDOCRINOLOGY | Facility: CLINIC | Age: 60
End: 2025-08-06

## 2025-08-11 ENCOUNTER — RX RENEWAL (OUTPATIENT)
Age: 60
End: 2025-08-11

## 2025-09-05 ENCOUNTER — TRANSCRIPTION ENCOUNTER (OUTPATIENT)
Age: 60
End: 2025-09-05